# Patient Record
Sex: MALE | Race: WHITE | HISPANIC OR LATINO | Employment: FULL TIME | URBAN - METROPOLITAN AREA
[De-identification: names, ages, dates, MRNs, and addresses within clinical notes are randomized per-mention and may not be internally consistent; named-entity substitution may affect disease eponyms.]

---

## 2018-07-17 ENCOUNTER — HOSPITAL ENCOUNTER (OUTPATIENT)
Facility: HOSPITAL | Age: 38
Discharge: HOME | End: 2018-07-18
Attending: EMERGENCY MEDICINE | Admitting: INTERNAL MEDICINE
Payer: COMMERCIAL

## 2018-07-17 DIAGNOSIS — I48.91 ATRIAL FIBRILLATION, UNSPECIFIED TYPE: Primary | ICD-10-CM

## 2018-07-17 DIAGNOSIS — I48.91 A-FIB: ICD-10-CM

## 2018-07-17 DIAGNOSIS — I48.92 ATRIAL FLUTTER, UNSPECIFIED TYPE: ICD-10-CM

## 2018-07-17 DIAGNOSIS — I48.91 ATRIAL FIBRILLATION: ICD-10-CM

## 2018-07-17 DIAGNOSIS — I47.10 SVT (SUPRAVENTRICULAR TACHYCARDIA): ICD-10-CM

## 2018-07-17 DIAGNOSIS — R00.0 TACHYCARDIA: ICD-10-CM

## 2018-07-17 LAB
ALBUMIN SERPL BCP-MCNC: 4.4 G/DL
ALP SERPL-CCNC: 67 U/L
ALT SERPL W/O P-5'-P-CCNC: 30 U/L
AMPHET+METHAMPHET UR QL: NEGATIVE
ANION GAP SERPL CALC-SCNC: 10 MMOL/L
AST SERPL-CCNC: 23 U/L
BARBITURATES UR QL SCN>200 NG/ML: NEGATIVE
BASOPHILS # BLD AUTO: 0.02 K/UL
BASOPHILS NFR BLD: 0.2 %
BENZODIAZ UR QL SCN>200 NG/ML: NEGATIVE
BILIRUB SERPL-MCNC: 0.6 MG/DL
BUN SERPL-MCNC: 13 MG/DL
BZE UR QL SCN: NEGATIVE
CALCIUM SERPL-MCNC: 9.6 MG/DL
CANNABINOIDS UR QL SCN: NEGATIVE
CHLORIDE SERPL-SCNC: 103 MMOL/L
CO2 SERPL-SCNC: 27 MMOL/L
CREAT SERPL-MCNC: 1.2 MG/DL
CREAT UR-MCNC: 50.3 MG/DL
DIFFERENTIAL METHOD: ABNORMAL
EOSINOPHIL # BLD AUTO: 0.2 K/UL
EOSINOPHIL NFR BLD: 2.1 %
ERYTHROCYTE [DISTWIDTH] IN BLOOD BY AUTOMATED COUNT: 12.8 %
EST. GFR  (AFRICAN AMERICAN): >60 ML/MIN/1.73 M^2
EST. GFR  (NON AFRICAN AMERICAN): >60 ML/MIN/1.73 M^2
GLUCOSE SERPL-MCNC: 128 MG/DL
HCT VFR BLD AUTO: 47.9 %
HGB BLD-MCNC: 16.7 G/DL
LYMPHOCYTES # BLD AUTO: 5.4 K/UL
LYMPHOCYTES NFR BLD: 47.4 %
MCH RBC QN AUTO: 29.7 PG
MCHC RBC AUTO-ENTMCNC: 34.9 G/DL
MCV RBC AUTO: 85 FL
METHADONE UR QL SCN>300 NG/ML: NEGATIVE
MONOCYTES # BLD AUTO: 1 K/UL
MONOCYTES NFR BLD: 8.4 %
NEUTROPHILS # BLD AUTO: 4.8 K/UL
NEUTROPHILS NFR BLD: 41.6 %
OPIATES UR QL SCN: NEGATIVE
PCP UR QL SCN>25 NG/ML: NEGATIVE
PLATELET # BLD AUTO: 251 K/UL
PMV BLD AUTO: 9.8 FL
POTASSIUM SERPL-SCNC: 4 MMOL/L
PROT SERPL-MCNC: 7.7 G/DL
RBC # BLD AUTO: 5.62 M/UL
SODIUM SERPL-SCNC: 140 MMOL/L
TOXICOLOGY INFORMATION: NORMAL
TROPONIN I SERPL DL<=0.01 NG/ML-MCNC: <0.006 NG/ML
TSH SERPL DL<=0.005 MIU/L-ACNC: 2.9 UIU/ML
WBC # BLD AUTO: 11.44 K/UL

## 2018-07-17 PROCEDURE — 93005 ELECTROCARDIOGRAM TRACING: CPT

## 2018-07-17 PROCEDURE — 96375 TX/PRO/DX INJ NEW DRUG ADDON: CPT

## 2018-07-17 PROCEDURE — 96374 THER/PROPH/DIAG INJ IV PUSH: CPT

## 2018-07-17 PROCEDURE — 85025 COMPLETE CBC W/AUTO DIFF WBC: CPT

## 2018-07-17 PROCEDURE — G0378 HOSPITAL OBSERVATION PER HR: HCPCS

## 2018-07-17 PROCEDURE — 25000003 PHARM REV CODE 250: Performed by: EMERGENCY MEDICINE

## 2018-07-17 PROCEDURE — 96372 THER/PROPH/DIAG INJ SC/IM: CPT | Mod: 59

## 2018-07-17 PROCEDURE — 96361 HYDRATE IV INFUSION ADD-ON: CPT

## 2018-07-17 PROCEDURE — 93010 EKG 12-LEAD: ICD-10-PCS | Mod: 76,,, | Performed by: INTERNAL MEDICINE

## 2018-07-17 PROCEDURE — 93010 ELECTROCARDIOGRAM REPORT: CPT | Mod: 76,,, | Performed by: INTERNAL MEDICINE

## 2018-07-17 PROCEDURE — 99291 CRITICAL CARE FIRST HOUR: CPT | Mod: 25

## 2018-07-17 PROCEDURE — 84443 ASSAY THYROID STIM HORMONE: CPT

## 2018-07-17 PROCEDURE — 80307 DRUG TEST PRSMV CHEM ANLYZR: CPT

## 2018-07-17 PROCEDURE — 84484 ASSAY OF TROPONIN QUANT: CPT

## 2018-07-17 PROCEDURE — 63600175 PHARM REV CODE 636 W HCPCS: Performed by: EMERGENCY MEDICINE

## 2018-07-17 PROCEDURE — 80053 COMPREHEN METABOLIC PANEL: CPT

## 2018-07-17 RX ORDER — HYDROCODONE BITARTRATE AND ACETAMINOPHEN 10; 325 MG/1; MG/1
1 TABLET ORAL EVERY 4 HOURS PRN
Status: DISCONTINUED | OUTPATIENT
Start: 2018-07-17 | End: 2018-07-18 | Stop reason: HOSPADM

## 2018-07-17 RX ORDER — ENOXAPARIN SODIUM 100 MG/ML
1 INJECTION SUBCUTANEOUS
Status: DISCONTINUED | OUTPATIENT
Start: 2018-07-17 | End: 2018-07-18 | Stop reason: HOSPADM

## 2018-07-17 RX ORDER — SODIUM CHLORIDE 9 MG/ML
INJECTION, SOLUTION INTRAVENOUS CONTINUOUS
Status: DISCONTINUED | OUTPATIENT
Start: 2018-07-17 | End: 2018-07-18 | Stop reason: HOSPADM

## 2018-07-17 RX ORDER — HYDROCODONE BITARTRATE AND ACETAMINOPHEN 5; 325 MG/1; MG/1
1 TABLET ORAL EVERY 4 HOURS PRN
Status: DISCONTINUED | OUTPATIENT
Start: 2018-07-17 | End: 2018-07-18 | Stop reason: HOSPADM

## 2018-07-17 RX ORDER — ONDANSETRON 4 MG/1
4 TABLET, ORALLY DISINTEGRATING ORAL EVERY 8 HOURS PRN
Status: DISCONTINUED | OUTPATIENT
Start: 2018-07-17 | End: 2018-07-18 | Stop reason: HOSPADM

## 2018-07-17 RX ORDER — ACETAMINOPHEN 325 MG/1
650 TABLET ORAL EVERY 6 HOURS PRN
Status: DISCONTINUED | OUTPATIENT
Start: 2018-07-17 | End: 2018-07-18 | Stop reason: HOSPADM

## 2018-07-17 RX ORDER — AMOXICILLIN 250 MG
1 CAPSULE ORAL 2 TIMES DAILY
Status: DISCONTINUED | OUTPATIENT
Start: 2018-07-17 | End: 2018-07-18 | Stop reason: HOSPADM

## 2018-07-17 RX ORDER — PANTOPRAZOLE SODIUM 40 MG/1
40 TABLET, DELAYED RELEASE ORAL DAILY
Status: DISCONTINUED | OUTPATIENT
Start: 2018-07-18 | End: 2018-07-18 | Stop reason: HOSPADM

## 2018-07-17 RX ORDER — DILTIAZEM HYDROCHLORIDE 5 MG/ML
15 INJECTION INTRAVENOUS
Status: COMPLETED | OUTPATIENT
Start: 2018-07-17 | End: 2018-07-17

## 2018-07-17 RX ORDER — LORAZEPAM 2 MG/ML
INJECTION INTRAMUSCULAR
Status: DISPENSED
Start: 2018-07-17 | End: 2018-07-18

## 2018-07-17 RX ORDER — DILTIAZEM HYDROCHLORIDE 60 MG/1
120 TABLET, FILM COATED ORAL EVERY 12 HOURS
Status: DISCONTINUED | OUTPATIENT
Start: 2018-07-17 | End: 2018-07-18

## 2018-07-17 RX ADMIN — LORAZEPAM 0.5 MG: 2 INJECTION INTRAMUSCULAR; INTRAVENOUS at 08:07

## 2018-07-17 RX ADMIN — ENOXAPARIN SODIUM 100 MG: 100 INJECTION SUBCUTANEOUS at 09:07

## 2018-07-17 RX ADMIN — SODIUM CHLORIDE 1000 ML: 0.9 INJECTION, SOLUTION INTRAVENOUS at 07:07

## 2018-07-17 RX ADMIN — DILTIAZEM HYDROCHLORIDE 15 MG: 5 INJECTION INTRAVENOUS at 08:07

## 2018-07-17 RX ADMIN — SODIUM CHLORIDE: 0.9 INJECTION, SOLUTION INTRAVENOUS at 09:07

## 2018-07-17 RX ADMIN — DILTIAZEM HYDROCHLORIDE 120 MG: 60 TABLET, FILM COATED ORAL at 09:07

## 2018-07-18 ENCOUNTER — TELEPHONE (OUTPATIENT)
Dept: ELECTROPHYSIOLOGY | Facility: CLINIC | Age: 38
End: 2018-07-18

## 2018-07-18 VITALS
DIASTOLIC BLOOD PRESSURE: 59 MMHG | TEMPERATURE: 97 F | HEIGHT: 73 IN | RESPIRATION RATE: 79 BRPM | SYSTOLIC BLOOD PRESSURE: 129 MMHG | WEIGHT: 210.75 LBS | HEART RATE: 77 BPM | OXYGEN SATURATION: 99 % | BODY MASS INDEX: 27.93 KG/M2

## 2018-07-18 PROBLEM — I48.92 ATRIAL FLUTTER: Status: ACTIVE | Noted: 2018-07-17

## 2018-07-18 LAB
DIASTOLIC DYSFUNCTION: NO
ESTIMATED PA SYSTOLIC PRESSURE: 19.65
MITRAL VALVE MOBILITY: NORMAL
RETIRED EF AND QEF - SEE NOTES: 60 (ref 55–65)
TRICUSPID VALVE REGURGITATION: NORMAL
TROPONIN I SERPL DL<=0.01 NG/ML-MCNC: 0.13 NG/ML
TROPONIN I SERPL DL<=0.01 NG/ML-MCNC: 0.22 NG/ML

## 2018-07-18 PROCEDURE — 93005 ELECTROCARDIOGRAM TRACING: CPT

## 2018-07-18 PROCEDURE — 36415 COLL VENOUS BLD VENIPUNCTURE: CPT

## 2018-07-18 PROCEDURE — 25000003 PHARM REV CODE 250: Performed by: EMERGENCY MEDICINE

## 2018-07-18 PROCEDURE — 84484 ASSAY OF TROPONIN QUANT: CPT

## 2018-07-18 PROCEDURE — 94761 N-INVAS EAR/PLS OXIMETRY MLT: CPT

## 2018-07-18 PROCEDURE — 93306 TTE W/DOPPLER COMPLETE: CPT | Mod: 26,,, | Performed by: INTERNAL MEDICINE

## 2018-07-18 PROCEDURE — 99220 PR INITIAL OBSERVATION CARE,LEVL III: CPT | Mod: ,,, | Performed by: INTERNAL MEDICINE

## 2018-07-18 PROCEDURE — 93306 TTE W/DOPPLER COMPLETE: CPT

## 2018-07-18 PROCEDURE — 93010 ELECTROCARDIOGRAM REPORT: CPT | Mod: ,,, | Performed by: INTERNAL MEDICINE

## 2018-07-18 PROCEDURE — G0378 HOSPITAL OBSERVATION PER HR: HCPCS

## 2018-07-18 PROCEDURE — 63600175 PHARM REV CODE 636 W HCPCS: Performed by: EMERGENCY MEDICINE

## 2018-07-18 PROCEDURE — 25000003 PHARM REV CODE 250: Performed by: NURSE PRACTITIONER

## 2018-07-18 RX ORDER — ASPIRIN 81 MG/1
81 TABLET ORAL DAILY
Refills: 0 | COMMUNITY
Start: 2018-07-18 | End: 2023-04-04

## 2018-07-18 RX ORDER — DILTIAZEM HYDROCHLORIDE 180 MG/1
180 CAPSULE, COATED, EXTENDED RELEASE ORAL DAILY
Status: DISCONTINUED | OUTPATIENT
Start: 2018-07-18 | End: 2018-07-18 | Stop reason: HOSPADM

## 2018-07-18 RX ORDER — ASPIRIN 81 MG/1
81 TABLET ORAL DAILY
Status: DISCONTINUED | OUTPATIENT
Start: 2018-07-18 | End: 2018-07-18 | Stop reason: HOSPADM

## 2018-07-18 RX ORDER — DILTIAZEM HYDROCHLORIDE 180 MG/1
180 CAPSULE, COATED, EXTENDED RELEASE ORAL DAILY
Qty: 30 CAPSULE | Refills: 11 | Status: SHIPPED | OUTPATIENT
Start: 2018-07-18 | End: 2019-07-25 | Stop reason: SDUPTHER

## 2018-07-18 RX ADMIN — DILTIAZEM HYDROCHLORIDE 120 MG: 60 TABLET, FILM COATED ORAL at 09:07

## 2018-07-18 RX ADMIN — SODIUM CHLORIDE: 0.9 INJECTION, SOLUTION INTRAVENOUS at 05:07

## 2018-07-18 RX ADMIN — ENOXAPARIN SODIUM 100 MG: 100 INJECTION SUBCUTANEOUS at 09:07

## 2018-07-18 RX ADMIN — DILTIAZEM HYDROCHLORIDE 180 MG: 180 CAPSULE, COATED, EXTENDED RELEASE ORAL at 02:07

## 2018-07-18 RX ADMIN — ASPIRIN 81 MG: 81 TABLET, COATED ORAL at 02:07

## 2018-07-18 RX ADMIN — PANTOPRAZOLE SODIUM 40 MG: 40 TABLET, DELAYED RELEASE ORAL at 09:07

## 2018-07-18 RX ADMIN — SENNOSIDES AND DOCUSATE SODIUM 1 TABLET: 8.6; 5 TABLET ORAL at 09:07

## 2018-07-18 RX ADMIN — SENNOSIDES AND DOCUSATE SODIUM 1 TABLET: 8.6; 5 TABLET ORAL at 12:07

## 2018-07-18 NOTE — PLAN OF CARE
Problem: Patient Care Overview  Goal: Plan of Care Review  Outcome: Ongoing (interventions implemented as appropriate)  Admitted 38 year old Male patient from ER, came in to the unit around 2330H via stretcher.  MD informed regarding this admission, said that they corine see patient in the morning.  Received patient conscious, coherent GCS15. with IV line on the left antecubital area gauge 20 IVF infusing well with NSS 125cc/hr.  Case of SVT, went on controlled Atrial fibrillation  after receiving Diltizem IV  From ER. Patient on telemetry, Patient is asymptomatic, no subjective complaint of chest pain. Controlled atrial fibrillation reverted to NSR around 0510H in the morning today. Advised patient to call the nurse for any assistance, bed alarm on, side rails kept secure at all times. Call bell within reach. Bed brake on. Safety fall precaution measures noted. Intentional rounding rendered. Will continue to monitor patient. Stable VS, afebrile, not in pain

## 2018-07-18 NOTE — ASSESSMENT & PLAN NOTE
-presented with complaints of palpitations  -initial EKG with HR 260s; given IV Cardizem with slowed HR to 120s  -continued on oral Cardizem BID overnight with spontaneous conversion to NSR  -will do echocardiogram to evaluate LV function and valve structure  -plan for transition to Cardizem CD; continue ASA only; CHADSVAS score 0   -will refer to EP for discussion of RFA

## 2018-07-18 NOTE — PLAN OF CARE
[place] called Dr. Sage office, they were unable to give appoinemtns at this time and will contact the patient directly.  Patient has opted for pharmacy of choice for DC scripts and his wife here to bring him home at this time.  Follow-up With  Details  Why  Contact Info   Jorge Sage MD    This clinic will contact you with your follow up date and time with the Cardiology MD.  [address] [facility] [address]  [phone]             07/18/18 1345   Final Note   Assessment Type Final Discharge Note   Discharge Disposition Home   Hospital Follow Up  Appt(s) scheduled? Yes   Discharge plans and expectations educations in teach back method with documentation complete? Yes   Right Care Referral Info   Post Acute Recommendation No Care

## 2018-07-18 NOTE — HPI
37yo male with history of HLP who presented to the ER with complaints of palpitations. Mr. Krueger reports working his normal 12 hour shift yesterday and returning home. After dinner while sitting at rest, he complains of palpitations described as racing heart. The palpitations were associated with mild chest discomfort but was not associated with SOB, presyncope or syncope. He waited a few minutes expecting it to relieve however it was persisting therefore he presented to the ER for evaluation. He reports the palpitations were present upon arrival and eventually relieved after 45minutes. He denies any increase intake of ETOH or caffeine or recent OTC medication use. 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

## 2018-07-18 NOTE — PLAN OF CARE
Problem: Patient Care Overview  Goal: Plan of Care Review  Outcome: Ongoing (interventions implemented as appropriate)  Patient on RA with sats as documented.Will continue to monitor.

## 2018-07-18 NOTE — SUBJECTIVE & OBJECTIVE
Past Medical History:   Diagnosis Date    Elevated cholesterol     HLD (hyperlipidemia)     Hypertension     Tachycardia        History reviewed. No pertinent surgical history.    Review of patient's allergies indicates:  No Known Allergies    No current facility-administered medications on file prior to encounter.      No current outpatient prescriptions on file prior to encounter.     Family History     None        Social History Main Topics    Smoking status: Never Smoker    Smokeless tobacco: Never Used    Alcohol use Not on file    Drug use: No    Sexual activity: Not on file     Review of Systems   Constitution: Negative for chills, decreased appetite, diaphoresis, fever and weakness.   Cardiovascular: Negative for chest pain, claudication, cyanosis, dyspnea on exertion, irregular heartbeat, leg swelling, near-syncope, orthopnea, palpitations, paroxysmal nocturnal dyspnea and syncope.   Respiratory: Negative for cough, hemoptysis, shortness of breath and wheezing.    Gastrointestinal: Negative for bloating, abdominal pain, constipation, diarrhea, melena, nausea and vomiting.   Neurological: Negative for dizziness.     Objective:     Vital Signs (Most Recent):  Temp: 97.2 °F (36.2 °C) (07/18/18 0725)  Pulse: 84 (07/18/18 0725)  Resp: (!) 79 (07/18/18 0725)  BP: (!) 129/59 (07/18/18 0725)  SpO2: 99 % (07/18/18 0747) Vital Signs (24h Range):  Temp:  [97.2 °F (36.2 °C)-98.9 °F (37.2 °C)] 97.2 °F (36.2 °C)  Pulse:  [] 84  Resp:  [17-79] 79  SpO2:  [97 %-100 %] 99 %  BP: (119-146)/(59-91) 129/59     Weight: 95.6 kg (210 lb 12.2 oz)  Body mass index is 27.81 kg/m².    SpO2: 99 %  O2 Device (Oxygen Therapy): room air      Intake/Output Summary (Last 24 hours) at 07/18/18 1027  Last data filed at 07/18/18 0700   Gross per 24 hour   Intake             2325 ml   Output             2125 ml   Net              200 ml       Lines/Drains/Airways     Peripheral Intravenous Line                 Peripheral IV -  Single Lumen 07/17/18 1945 Left Antecubital less than 1 day                Physical Exam   Constitutional: He is oriented to person, place, and time. He appears well-developed and well-nourished. No distress.   Cardiovascular: Normal rate and regular rhythm.  Exam reveals no gallop.    No murmur heard.  Pulmonary/Chest: Effort normal and breath sounds normal. No respiratory distress. He has no wheezes.   Abdominal: Soft. Bowel sounds are normal. He exhibits no distension. There is no tenderness.   Neurological: He is alert and oriented to person, place, and time.   Skin: Skin is warm and dry.       Significant Labs:       Recent Labs  Lab 07/17/18 2000   CALCIUM 9.6   PROT 7.7      K 4.0   CO2 27      BUN 13   CREATININE 1.2   ALKPHOS 67   ALT 30   AST 23   BILITOT 0.6       Recent Labs  Lab 07/17/18 2000   WBC 11.44   RBC 5.62   HGB 16.7   HCT 47.9      MCV 85   MCH 29.7   MCHC 34.9         Significant Imaging: Echocardiogram: 2D echo with color flow doppler: ordered with pending results

## 2018-07-18 NOTE — TELEPHONE ENCOUNTER
----- Message from Dayana Ambrose sent at 7/18/2018 12:04 PM CDT -----  Contact: Pt called   Pt would like to schedule a hospital f/u visit with Dr. Sage. Please call pt @ [phone]. Thank you.

## 2018-07-18 NOTE — HOSPITAL COURSE
7/17/2018 Presented to the ER with complaints of palpitations. EKG upon presentation with HR 260s with wide complex tachycardia. Given IV Cardizem with HR slowed to 120s. CBC, BMP and TSH WNL. Admitted to [facility] for further management  7/18/2018 Placed on oral Cardizem BID with spontaneous conversion overnight to NSR. Will continue Cardizem along with ASA. Echocardiogram today. Anticipate discharge this afternoon

## 2018-07-18 NOTE — PLAN OF CARE
Patient accompanied by spouse, independent and drives, wife is of same capacity.  Expects DC today.       07/18/18 1341   Discharge Assessment   Assessment Type Discharge Planning Assessment   Confirmed/corrected address and phone number on facesheet? Yes   Assessment information obtained from? Patient   Prior to hospitilization cognitive status: Alert/Oriented   Prior to hospitalization functional status: Independent   Current cognitive status: Alert/Oriented   Current Functional Status: Independent   Lives With spouse   Able to Return to Prior Arrangements yes   Is patient able to care for self after discharge? Yes   Readmission Within The Last 30 Days no previous admission in last 30 days   Transportation Available none

## 2018-07-18 NOTE — ED NOTES
Pt here c/o while watching t.v. He started coughing a few times in a row and then felt palpitations--with this SOB and chest pressure.  First vagal attempt done by Maribel-no change in heart rate--tachycardic with wide QRS rate 250s-260. Pt is AAOx3,following commands,resp are regular.

## 2018-07-18 NOTE — TELEPHONE ENCOUNTER
I returned patient call, and spoke to his wife(Kelley).  We scheduled patient an appointment per Hospital D/C 7-18-18 [facility]  To see .  Wife stated she will  [facility] Records.  Stated patient had a few ekgs done there..

## 2018-07-18 NOTE — H&P
[facility]  Cardiology  History and Physical     Patient Name: Fermin Krueger  MRN: [id number]  Admission Date: 7/17/2018  Code Status: Full Code   Attending Provider: Stephani Steinberg MD   Primary Care Physician: Gerard Yang MD  Principal Problem:Atrial flutter    Patient information was obtained from patient and past medical records.     Subjective:     Chief Complaint:  palpitations     HPI:  39yo male with history of HLP who presented to the ER with complaints of palpitations. Mr. Krueger reports working his normal 12 hour shift yesterday and returning home. After dinner while sitting at rest, he complains of palpitations described as racing heart. The palpitations were associated with mild chest discomfort but was not associated with SOB, presyncope or syncope. He waited a few minutes expecting it to relieve however it was persisting therefore he presented to the ER for evaluation. He reports the palpitations were present upon arrival and eventually relieved after 45minutes. He denies any increase intake of ETOH or caffeine or recent OTC medication use. 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 EKG upon presentation with HR 260s with wide complex tachycardia. Given IV Cardizem with HR slowed to 120s. CBC, BMP and TSH WNL. Admitted to [facility] for further management  7/18/2018 Placed on oral Cardizem BID with spontaneous conversion overnight to NSR. Will continue Cardizem along with ASA. Echocardiogram today. Anticipate discharge this afternoon     Past Medical History:   Diagnosis Date    Elevated cholesterol     HLD (hyperlipidemia)     Hypertension     Tachycardia        History reviewed. No pertinent surgical history.    Review of patient's allergies indicates:  No Known Allergies    No current facility-administered medications on  file prior to encounter.      No current outpatient prescriptions on file prior to encounter.     Family History     None        Social History Main Topics    Smoking status: Never Smoker    Smokeless tobacco: Never Used    Alcohol use Not on file    Drug use: No    Sexual activity: Not on file     Review of Systems   Constitution: Negative for chills, decreased appetite, diaphoresis, fever and weakness.   Cardiovascular: Negative for chest pain, claudication, cyanosis, dyspnea on exertion, irregular heartbeat, leg swelling, near-syncope, orthopnea, palpitations, paroxysmal nocturnal dyspnea and syncope.   Respiratory: Negative for cough, hemoptysis, shortness of breath and wheezing.    Gastrointestinal: Negative for bloating, abdominal pain, constipation, diarrhea, melena, nausea and vomiting.   Neurological: Negative for dizziness.     Objective:     Vital Signs (Most Recent):  Temp: 97.2 °F (36.2 °C) (07/18/18 0725)  Pulse: 84 (07/18/18 0725)  Resp: (!) 79 (07/18/18 0725)  BP: (!) 129/59 (07/18/18 0725)  SpO2: 99 % (07/18/18 0747) Vital Signs (24h Range):  Temp:  [97.2 °F (36.2 °C)-98.9 °F (37.2 °C)] 97.2 °F (36.2 °C)  Pulse:  [] 84  Resp:  [17-79] 79  SpO2:  [97 %-100 %] 99 %  BP: (119-146)/(59-91) 129/59     Weight: 95.6 kg (210 lb 12.2 oz)  Body mass index is 27.81 kg/m².    SpO2: 99 %  O2 Device (Oxygen Therapy): room air      Intake/Output Summary (Last 24 hours) at 07/18/18 1027  Last data filed at 07/18/18 0700   Gross per 24 hour   Intake             2325 ml   Output             2125 ml   Net              200 ml       Lines/Drains/Airways     Peripheral Intravenous Line                 Peripheral IV - Single Lumen 07/17/18 1945 Left Antecubital less than 1 day                Physical Exam   Constitutional: He is oriented to person, place, and time. He appears well-developed and well-nourished. No distress.   Cardiovascular: Normal rate and regular rhythm.  Exam reveals no gallop.    No murmur  heard.  Pulmonary/Chest: Effort normal and breath sounds normal. No respiratory distress. He has no wheezes.   Abdominal: Soft. Bowel sounds are normal. He exhibits no distension. There is no tenderness.   Neurological: He is alert and oriented to person, place, and time.   Skin: Skin is warm and dry.       Significant Labs:       Recent Labs  Lab 07/17/18 2000   CALCIUM 9.6   PROT 7.7      K 4.0   CO2 27      BUN 13   CREATININE 1.2   ALKPHOS 67   ALT 30   AST 23   BILITOT 0.6       Recent Labs  Lab 07/17/18 2000   WBC 11.44   RBC 5.62   HGB 16.7   HCT 47.9      MCV 85   MCH 29.7   MCHC 34.9         Significant Imaging: Echocardiogram: 2D echo with color flow doppler: ordered with pending results     Assessment and Plan:     * Atrial flutter    -presented with complaints of palpitations  -initial EKG with HR 260s; given IV Cardizem with slowed HR to 120s  -continued on oral Cardizem BID overnight with spontaneous conversion to NSR  -will do echocardiogram to evaluate LV function and valve structure  -plan for transition to Cardizem CD; continue ASA only; CHADSVAS score 0   -will refer to EP for discussion of RFA             VTE Risk Mitigation         Ordered     enoxaparin injection 100 mg  Every 12 hours (non-standard times)      07/17/18 2121          LALY Barron, ANP  Cardiology   [facility]

## 2018-07-18 NOTE — NURSING
Pt is awake and alert. Pt continued to be NSR on telemetry with HR in the 80's.No ectopy noted.Denies any pain. No distress noted. IV removed from left AC,cath tip intact. Tele monitor removed. Discharge instructions given to patient and spouse.Discussed side effects of medications. All questions answered. Verbalized understanding.

## 2018-07-18 NOTE — ED NOTES
Pt states he had this 1 year ago and had a complete heart work up and was not placed on medications. Pt has history of high cholesterol and HTN--no medications Rx. He states he took 4 81 mg aspirin prior to arrival today. Was on baby aspirin daily until about 3 months ago. And reports history of valve problem--cardiologist did evaluate this.

## 2018-07-19 ENCOUNTER — OFFICE VISIT (OUTPATIENT)
Dept: ELECTROPHYSIOLOGY | Facility: CLINIC | Age: 38
End: 2018-07-19
Payer: COMMERCIAL

## 2018-07-19 ENCOUNTER — HOSPITAL ENCOUNTER (OUTPATIENT)
Dept: CARDIOLOGY | Facility: CLINIC | Age: 38
Discharge: HOME | End: 2018-07-19
Payer: COMMERCIAL

## 2018-07-19 VITALS
WEIGHT: 213.88 LBS | DIASTOLIC BLOOD PRESSURE: 80 MMHG | HEIGHT: 73 IN | SYSTOLIC BLOOD PRESSURE: 128 MMHG | BODY MASS INDEX: 28.34 KG/M2 | HEART RATE: 70 BPM

## 2018-07-19 DIAGNOSIS — I48.0 PAROXYSMAL ATRIAL FIBRILLATION: Primary | ICD-10-CM

## 2018-07-19 DIAGNOSIS — I48.3 TYPICAL ATRIAL FLUTTER: ICD-10-CM

## 2018-07-19 DIAGNOSIS — I48.92 ATRIAL FLUTTER, UNSPECIFIED TYPE: ICD-10-CM

## 2018-07-19 PROCEDURE — 3008F BODY MASS INDEX DOCD: CPT | Mod: CPTII,S$GLB,, | Performed by: INTERNAL MEDICINE

## 2018-07-19 PROCEDURE — 93000 ELECTROCARDIOGRAM COMPLETE: CPT | Mod: S$GLB,,, | Performed by: INTERNAL MEDICINE

## 2018-07-19 PROCEDURE — 99999 PR PBB SHADOW E&M-EST. PATIENT-LVL III: CPT | Mod: PBBFAC,,, | Performed by: INTERNAL MEDICINE

## 2018-07-19 PROCEDURE — 99215 OFFICE O/P EST HI 40 MIN: CPT | Mod: S$GLB,,, | Performed by: INTERNAL MEDICINE

## 2018-07-19 RX ORDER — PROPAFENONE HYDROCHLORIDE 225 MG/1
225 CAPSULE, EXTENDED RELEASE ORAL EVERY 12 HOURS
Qty: 60 CAPSULE | Refills: 11 | Status: SHIPPED | OUTPATIENT
Start: 2018-07-19 | End: 2019-07-19 | Stop reason: SDUPTHER

## 2018-07-19 NOTE — PROGRESS NOTES
Subjective:    Patient ID:  Fermin Krueger is a 38 y.o. male who presents for evaluation of Atrial Flutter    Referring Cardiac Practitioner/Cardiologist: Ilsa Foster NP and Stephani Steinberg MD    HPI  I had the pleasure of seeing Mr. Krueger today in our electrophysiology clinic in consultation for his atrial arrhythmia. As you are aware he is a pleasant 38 year-old man with     He presented to [facility] on 7/17/2018 with complaint of palpitations after completing a 12 hour work shift. His ECG was consistent with a right bundle/wide complex tachycardia at 260 bpm. He was given IV diltiazem and rate slowed revealing what is consistent with typical atrial flutter and variable AV block. The next ECG was consistent with atrial fibrillation and then another later with sinus rhythm. He felt better. ECHO noted preserved LVEF. TSH was normal.    He reports an episode 1 year ago that lasted 5 minutes. He went to [facility]. Stress test was normal. 14 day event monitor was unremarkable. He notes occasional short palpitations.    Both episodes occurred after eating a large meal.    ECHO 7/2018 CONCLUSIONS     1 - Normal left ventricular systolic function (EF 60-65%).     2 - Normal left ventricular diastolic function.     3 - Normal right ventricular systolic function .     4 - The estimated PA systolic pressure is 20 mmHg.      Review of Systems   Constitution: Negative for fever, weakness and malaise/fatigue.   HENT: Negative for congestion and sore throat.    Eyes: Negative for blurred vision and visual disturbance.   Cardiovascular: Positive for irregular heartbeat and palpitations. Negative for chest pain, dyspnea on exertion, leg swelling, near-syncope, orthopnea, paroxysmal nocturnal dyspnea and syncope.   Respiratory: Negative for cough and shortness of breath.    Hematologic/Lymphatic: Negative for bleeding problem. Does not bruise/bleed easily.   Skin: Negative.    Musculoskeletal: Negative.     Gastrointestinal: Negative for bloating and abdominal pain.   Neurological: Negative for dizziness and focal weakness.        Objective:    Physical Exam   Constitutional: He is oriented to person, place, and time. He appears well-developed and well-nourished. No distress.   HENT:   Head: Normocephalic and atraumatic.   Eyes: Conjunctivae are normal. Right eye exhibits no discharge. Left eye exhibits no discharge.   Neck: Neck supple. No JVD present.   Cardiovascular: Normal rate and regular rhythm.  Exam reveals no gallop and no friction rub.    No murmur heard.  Pulmonary/Chest: Effort normal and breath sounds normal. No respiratory distress. He has no wheezes. He has no rales.   Abdominal: Soft. Bowel sounds are normal. He exhibits no distension. There is no tenderness. There is no rebound.   Musculoskeletal: He exhibits no edema.   Neurological: He is alert and oriented to person, place, and time.   Skin: Skin is warm and dry. He is not diaphoretic.   Psychiatric: He has a normal mood and affect. Judgment and thought content normal.   Vitals reviewed.        Assessment:       1. Paroxysmal atrial fibrillation    2. Typical atrial flutter         Plan:       In summary, Mr. Krueger is a pleasant man with symptomatic paroxysmal atrial fibrillation and flutter. Recent episode of flutter was consistent with 1:1 AV conduction with aberrant conduction. I had a long discussion with the patient about the pathophysiology and risks of atrial fibrillation and flutter and their basic pathophysiology, including its health implications and treatment options with the patient. Specifically, I addressed the need for CVA (stroke) prophylaxis with aspirin versus oral anticoagulation (warfarin vs NOACs, discussed bleeding risks, irreversibility of NOACs vs Vitamin K/plasma reversal of warfarin, and need to come to the ER for any head trauma for CT scanning even if asymptomatic). His [id number] score is 0 and long-term  anti-coagulation is currently not indicated. I also discussed the goal to reduce symptomatic arrhythmic episodes by pharmacologic and/or procedural methods and utilizing a rhythm versus a rate control strategy.     I spent about a half hour discussing the nature of PVI including transseptal puncture. We discussed risks and benefits at length. Our discussion included, but was not limited to the risk of death, infection, bleeding, stroke, MI, cardiac perforation, embolism, cardiac tamponade, vascular injury, AE fistula, injury to phrenic nerve, pulmonary vein stenosis and other organic injury including the possibility for need for surgery or pacemaker implantation.      I offered AAD versus PVI and RFA of CTI for symptomatic pAF/AFL. After our discussion, the patient opted for drug therapy for the time being.    Continue diltiazem, start propafenone long acting 225mg q12h    RTC in 3 months, if he chooses ablation will arrange    Thank you for allowing me to participate in the care of this patient. Please do not hesitate to call me with any questions or concerns.    Jorge Sage MD, PhD  Cardiac Electrophysiology

## 2018-07-19 NOTE — DISCHARGE SUMMARY
DC Summary    Admission Date: 7/17/2018  Discharge Date: 7/18/2018  Attending Physician: Stephani Steinberg      Condition on Discharge: Stable    Final Diagnosis:   SVT, afib with RVR, PAF  Procedures:   none  History of Present Illness : Refer to H&P admission note  Laboratory/Data : Lab Results   Component Value Date     07/17/2018    K 4.0 07/17/2018     07/17/2018    CO2 27 07/17/2018    BUN 13 07/17/2018    CREATININE 1.2 07/17/2018     (H) 07/17/2018    AST 23 07/17/2018    ALT 30 07/17/2018    ALBUMIN 4.4 07/17/2018    PROT 7.7 07/17/2018    BILITOT 0.6 07/17/2018    WBC 11.44 07/17/2018    HGB 16.7 07/17/2018    HCT 47.9 07/17/2018    MCV 85 07/17/2018     07/17/2018    TSH 2.903 07/17/2018       No results for input(s): CPK, CPKMB, TROPONINI, MB in the last 24 hours.      Recent Labs  Lab 07/18/18  0745   TROPONINI 0.128*         Lab Results   Component Value Date    TSH 2.903 07/17/2018       No results found for: HGBA1C      Hospital Course:  Patient present with SVT and Afib with RVR. Placed on po cardizem with lovenox and cardioverted over night so discharge to f/u with Cardiology as out patient. Stable for d/c. ChadsVasc=0    Discharge Medications:    Fermin Krueger   Home Medication Instructions ANIRUDH:[id number]    Printed on:07/19/18 1340   Medication Information                      aspirin (ECOTRIN) 81 MG EC tablet  Take 1 tablet (81 mg total) by mouth once daily.             diltiaZEM (CARDIZEM CD) 180 MG 24 hr capsule  Take 1 capsule (180 mg total) by mouth once daily.               Discharge Instructions: Heart healthy diet, compliance with medications  Follow up Appointments: f/u with me in 2 weeks, f/u with Dr. Sage. today    Disposition: discharge HOME

## 2018-07-19 NOTE — PATIENT INSTRUCTIONS
Qué es el aleteo auricular y la fibrilación auricular?        El corazón tiene billings propio sistema eléctrico, que genera las señales para comenzar cada latido. El latido del corazón empieza en easton de gali dos cavidades superiores, llamadas aurículas. Ciertos trastornos pueden causar que el latido de las aurículas sea demasiado rápido. Cuando el latido es rápido elva sigue siendo regular, el trastorno se llama aleteo auricular. Cuando las aurículas laten muy rápidamente y de forma irregular, el trastorno se conoce anne fibrilación auricular.  Causas del aleteo y la fibrilación auricular  Algunas de las causas de estos problemas son:  · Un ataque al corazón anterior  · Hipertensión (theresa presión arterial)  · Problemas de la glándula tiroidea  En muchos casos, la causa es desconocida.  Cuando las aurículas laten demasiado rápido  Si las aurículas laten demasiado rápido solamente de vez en cuando, el problema se conoce anne easton alteración paroxística del ritmo cardíaco. Si laten siempre demasiado rápido, se trata de un problema crónico.  Aleteo auricular  En el aleteo auricular, las señales eléctricas se desplazan en un circuito cerrado dentro de las aurículas y las obligan a latir demasiado rápido.  · El aleteo auricular puede causar síntomas similares a los de la fibrilación auricular. Además, puede producir ritmos aun más rápidos e irregulares en la fibrilación auricular.  Fibrilación auricular  En la fibrilación auricular, las células de las aurículas emiten señales eléctricas adicionales que las obligan a latir muy rápido y de forma irregular.  · Las aurículas laten tan rápido y de forma tan irregular que pueden simplemente temblar sin llegar a contraerse. Si las aurículas no se contraen, no mueven suficiente lena hacia las cavidades inferiores (los ventrículos), lo cual puede causar mareo o debilidad.  · La lena que no se mueve puede estancarse y formar coágulos en las aurículas. Estos coágulos pueden  desplazarse a otras partes del cuerpo y causar problemas serios anne un ataque cerebral.  Algunos de los síntomas del aleteo y la fibrilación auricular son:  · Palpitaciones (latidos cardíacos rápidos y agitados)  · Debilidad o cansancio  · Falta de aire al respirar  · Dolor o sensación de opresión en el pecho  · Mareos o sensación de aturdimiento  · Desmayos   Date Last Reviewed: 2/26/2014  © 5301-9566 [employer]. [address] [place] [id number]. Todos los derechos reservados. Esta información no pretende sustituir la atención médica profesional. Sólo billings médico puede diagnosticar y tratar un problema de huma.        Ablación con catéter    Fabienne arritmia (trastorno del ritmo cardíaco) puede producir fabienne frecuencia cardíaca demasiado rápida. La causa del problema suele estar en ciertas células defectuosas en el tejido del corazón. Los síntomas pueden ser molestos y pueden consistir en un ritmo cardíaco irregular, mareos y falta de aliento. Billings médico le ha recomendado fabienne ablación con catéter para tratar billings arritmia. Nettie procedimiento elimina las células que están causando el problema.  Antes del procedimiento  Antes de la ablación con catéter, se reunirá con el electrofisiólogo (médico especializado en el corazón especialmente preparado) que realizará el procedimiento y le indicará cómo prepararse. Es probable que le pidan que deje de ketan los medicamentos para el ritmo cardíaco, o que los cambie, kaila un período de tiempo antes del procedimiento. Siga las instrucciones de billings médico. Siga también estos consejos:  · Dígale a billings médico todos los medicamentos que jose r, ya lashae recetados o de venta tawanna, incluidas las hierbas medicinales, los suplementos y las vitaminas. [place] incluye también los medicamentos de uso diario, anne la insulina y los anticoagulantes. Si es alérgico a algún tipo de medicamento, avise al médico.  · Hágase todos los análisis de lena y las demás pruebas rutinarias  de laboratorio que le recomienden.  · No coma ni isi nada 12 horas antes del procedimiento.  Cómo se realiza la ablación con catéter  Para la ablación con catéter, se usan alambres finos y flexibles llamados catéteres con electrodo para buscar y destruir (extirpar) las células defectuosas. El procedimiento se lleva a cabo de la siguiente manera:  · Se traza un mapa de las señales eléctricas del corazón. Para encontrar el problema, se realiza un estudio electrofisiológico (EPS, por gali siglas en inglés). Kaila lionel estudio, el médico intenta inducir (iniciar) billings arritmia. A continuación, se crea un mapa de los impulsos eléctricos del corazón en el que se muestra el tipo de arritmia que usted tiene y la ubicación del problema. Utilizando el mapa anne guía, el médico sabe dónde debe realizar la ablación.  · Se destruyen las células defectuosas. Easton vez que el estudio electrofisiológico muestre la ubicación del problema, se introduce un catéter-electrodo hasta la alize afectada y se envía energía a través del catéter para destruir las células defectuosas.  · Se vuelve a comprobar el ritmo cardíaco. East[place] vez realizada la ablación de las células defectuosas, el médico intenta inducir o provocar de nuevo la arritmia. Si no es posible inducir un ritmo rápido, la ablación produjo el efecto deseado. Becka si el ritmo rápido comienza de nuevo, es probable que sea necesaria easton ablación adicional.  Billings experiencia kaila la ablación con catéter  En la mayoría de los casos, la ablación con catéter se realiza en un laboratorio de electrofisiología. Suele ketan entre dos y cuatro horas y, en ocasiones, más tiempo. Le darán medicamentos para evitar el dolor. Los medicamentos también pueden ayudarle a relajarse o a dormir kaila el procedimiento. Informe al médico o a la enfermera si siente molestias kaila la operación.  · Principio. En primer lugar le lavarán la piel de la jenny o del simi y le depilarán todo el vello que  hubiera en kevin alize. Nettie es el lugar donde le introducirán los catéteres. Le conectarán fabienne línea intravenosa (IV) en el brazo para administrarle medicamentos y líquidos. Para ayudar a mantener estéril (sin gérmenes) el sitio de la inserción, le envolverán el cuerpo con sábanas. Solo quedará expuesta la alize donde se insertarán los catéteres.  · Introducción de los catéteres. Para evitar el dolor, le dormirán con anestesia local la piel de la alize donde se insertarán los catéteres. A continuación, se utiliza fabienne aguja para hacer punciones en la vena o en la arteria y se introducen los catéteres a través de dichas punciones, guiándolos hasta el corazón con ayuda de la imagen en un monitor de blanquita X.  · Finalización. Fabienne vez concluido el procedimiento, se quitan los catéteres y se aplica presión en las punciones para facilitar billings cierre. No se necesitan suturas. Por último, lo trasladarán a la yeny de recuperación para que descanse. Deberá permanecer acostado por algunas horas. Le pedirán que por unas horas no mueva la pierna donde le insertaron los catéteres.   Riesgos y complicaciones  Los riesgos de la ablación con catéter son bastante bajos en comparación con los beneficios que proporciona. Hable con billings médico antes del procedimiento. Entre los riesgos y las posibles complicaciones se encuentran:  · Moretones o sangrado  · Coágulos de lena  · Un ritmo cardíaco demasiado lento (que requiere un marcapasos permanente)  · Perforación del músculo cardíaco, vaso sanguíneo o pulmón (puede requerir fabienne operación quirúrgica de emergencia)  · Daños a fabienne válvula cardíaca (poco probable)  · Ataque cerebral o ataque al corazón (también llamado infarto irma de miocardio, o MADISON)  · Muerte (extremadamente raro)   Date Last Reviewed: 5/1/2016  © 0801-0759 [employer]. [address] [place] [address], [place] [address]. Todos los derechos reservados. Esta información no pretende sustituir la atención médica  profesional. Sólo billings médico puede diagnosticar y tratar un problema de huma.

## 2018-07-20 DIAGNOSIS — R07.9 CHEST PAIN, UNSPECIFIED: Primary | ICD-10-CM

## 2018-07-20 DIAGNOSIS — I48.92 ATRIAL FLUTTER, UNSPECIFIED TYPE: Primary | ICD-10-CM

## 2018-07-21 NOTE — ED PROVIDER NOTES
Encounter Date: 7/17/2018       History     Chief Complaint   Patient presents with    Tachycardia     pt to triage ambulatory and began with heart racing while sitting at rest approx 30 mins prior to arrival; pt reports occured about 1 year ago and has resolved     HPI   This is a 38 y.o. male who has a past medical history of Elevated cholesterol; HLD (hyperlipidemia); Hypertension; and Tachycardia.   The patient presents to the Emergency Department with palpitations.   Symptoms began 30 min prior to arrival. Symptoms are associated with CP and shortness of breath, dizziness.  Pt denies diaphoresis.   Symptoms are aggravated by nothing.  Symptoms are relieved by nothing..   Patient has prior history of similar symptoms and was seen at . Not sure of diagnosis.  Pt has no past surgical history on file.      Review of patient's allergies indicates:  No Known Allergies  Past Medical History:   Diagnosis Date    Elevated cholesterol     HLD (hyperlipidemia)     Hypertension     Tachycardia      History reviewed. No pertinent surgical history.  History reviewed. No pertinent family history.  Social History   Substance Use Topics    Smoking status: Never Smoker    Smokeless tobacco: Never Used    Alcohol use Not on file     Review of Systems   Unable to perform ROS: Acuity of condition   Constitutional: Positive for activity change.   Respiratory: Positive for shortness of breath.    Cardiovascular: Positive for chest pain and palpitations.   Psychiatric/Behavioral: The patient is nervous/anxious.        Physical Exam     Initial Vitals [07/17/18 1942]   BP Pulse Resp Temp SpO2   134/83 (!) 172 20 98.9 °F (37.2 °C) 98 %      MAP       --         Physical Exam    Nursing note and vitals reviewed.  Constitutional: He appears well-developed and well-nourished. He appears distressed.   HENT:   Head: Normocephalic and atraumatic.   Mouth/Throat: Oropharynx is clear and moist.   Eyes: Conjunctivae are normal. Pupils  are equal, round, and reactive to light.   Neck: Normal range of motion. Neck supple.   Cardiovascular: Regular rhythm, normal heart sounds and intact distal pulses.   Tachycardia   Pulmonary/Chest: Breath sounds normal. No respiratory distress.   Abdominal: Soft. Bowel sounds are normal. He exhibits no distension. There is no tenderness.   Musculoskeletal: Normal range of motion. He exhibits no edema or tenderness.   Lymphadenopathy:     He has no cervical adenopathy.   Neurological: He is alert and oriented to person, place, and time.   Skin: Skin is warm and dry. Capillary refill takes less than 2 seconds. No rash noted. No erythema.   Psychiatric: He has a normal mood and affect. Thought content normal.         ED Course   Critical Care  Date/Time: 7/21/2018 2:16 AM  Performed by: MAUREEN KNOX  Authorized by: MAUREEN KNOX   Total critical care time (exclusive of procedural time) : 40 minutes        Labs Reviewed   CBC W/ AUTO DIFFERENTIAL - Abnormal; Notable for the following:        Result Value    Lymph # 5.4 (*)     All other components within normal limits   COMPREHENSIVE METABOLIC PANEL - Abnormal; Notable for the following:     Glucose 128 (*)     All other components within normal limits   TROPONIN I   TSH   DRUG SCREEN PANEL, URINE EMERGENCY        ECG Results          Repeat EKG 12-lead (Final result)  Result time 07/18/18 11:04:42    Final result by Interface, Lab In [facility] (07/18/18 11:04:42)                 Narrative:    Test Reason : R00.0,  Blood Pressure : / mmHG  Vent. Rate : 120 BPM     Atrial Rate : 271 BPM     P-R Int : 000 ms          QRS Dur : 140 ms      QT Int : 330 ms       P-R-T Axes : 256 045 037 degrees     QTc Int : 466 ms    Atrial flutter with variable A-V block  Nonspecific intraventricular block  T wave abnormality, consider lateral ischemia  Abnormal ECG  When compared with ECG of 17-JUL-2018 19:51,  Atrial flutter has replaced Wide QRS tachycardia  Vent. rate has decreased   BPM  Confirmed by Stephani Steinberg MD ([id number]) on 7/18/2018 11:04:30 AM    Referred By: MAUREEN KNOX           Confirmed By:Stephani Steinberg MD                             EKG 12-lead (Final result)  Result time 07/18/18 11:04:31    Final result by Interface, Lab In [facility] (07/18/18 11:04:31)                 Narrative:    Test Reason : R00.0,  Blood Pressure : / mmHG  Vent. Rate : 260 BPM     Atrial Rate : 000 BPM     P-R Int : 000 ms          QRS Dur : 206 ms      QT Int : 202 ms       P-R-T Axes : 000 190 000 degrees     QTc Int : 420 ms      Wide QRS tachycardia , likely SVT  Right bundle branch block  Septal infarct ,age undetermined  Lateral infarct ,age undetermined  Abnormal ECG  No previous ECGs available  Confirmed by Stephani Steinberg MD ([id number]) on 7/18/2018 11:04:21 AM    Referred By: MAUREEN KNOX           Confirmed By:Stephani Steinberg MD                            Imaging Results          X-Ray Chest AP Portable (Final result)  Result time 07/17/18 20:28:29    Final result by Tio Munguia MD (07/17/18 20:28:29)                 Impression:      No radiographic acute intrathoracic process seen.      Electronically signed by: Tio Munguia MD  Date:    07/17/2018  Time:    20:28             Narrative:    EXAMINATION:  XR CHEST AP PORTABLE    CLINICAL HISTORY:  Supraventricular tachycardia    TECHNIQUE:  Single frontal view of the chest was performed.    COMPARISON:  None    FINDINGS:  Monitoring leads overlie the chest.  Cardiomediastinal silhouette is within normal limits.  The lungs are symmetrically well expanded and clear.  No pleural effusion or pneumothorax.  No acute osseous process seen.                                                   ED Course as of Jul 20 2033   Tue Jul 17, 2018   1945 I was called to the room by the nurse for patient with heart rate in the 230s.  On exam patient appears uncomfortable, heart rate to 60 in a wide complex rhythm.  Considering patient's age and prior history of similar  instance of fast heart rate and palpitations, patient might have SVT or AFib/flutter with aberrancy.  Will attempt vagal maneuvers to convert.  If unsuccessful will consider adenosine.  Patient is not hypotensive, not in significant pain at this time, not unstable.  If patient does have any of those, we will consider cardioversion  [NP]   1950 Patient converted with modified vagal maneuver with carotid massage.  Patient now in a a flutter with variable AV block, intraventricular block in the 120s.  [NP]   2101 Troponin I: <0.006 [NP]   2101 Sodium: 140 [NP]   2101 Potassium: 4.0 [NP]   2101 Chloride: 103 [NP]   2101 CO2: 27 [NP]   2101 Glucose: (!) 128 [NP]   2101 BUN, Bld: 13 [NP]   2101 Creatinine: 1.2 [NP]   2101 WBC: 11.44 [NP]   2101 RBC: 5.62 [NP]   2101 Hemoglobin: 16.7 [NP]   2101 Hematocrit: 47.9 [NP]   2101 Platelets: 251 [NP]   2101 Phencyclidine: Negative [NP]   2101 Amphetamine Screen, Ur: Negative [NP]   2101 Barbiturate Screen, Ur: Negative [NP]   2101 Cocaine (Metab.): Negative [NP]   2101 Methadone metabolites: Negative [NP]   2101 Benzodiazepines: Negative [NP]   2101 The patient received Cardizem and heart rate now 90 to low 100s.  Will repeat EKG  [NP]      ED Course User Index  [NP] Conner Moulton MD     Case discussed with Dr. Steinberg, cardiology, who states repeat EKG is not WPW.  Recommended Cardizem which was given and heart rate improved.  Patient will be admitted to his service for new onset AFib.    Clinical Impression:     1. Atrial fibrillation, unspecified type    2. Tachycardia    3. SVT (supraventricular tachycardia)    4. Atrial fibrillation    5. A-fib    6. Atrial flutter, unspecified type                                  Conner Moulton MD  07/21/18 7922

## 2018-10-22 DIAGNOSIS — I48.0 PAROXYSMAL ATRIAL FIBRILLATION: Primary | ICD-10-CM

## 2018-10-23 ENCOUNTER — OFFICE VISIT (OUTPATIENT)
Dept: ELECTROPHYSIOLOGY | Facility: CLINIC | Age: 38
End: 2018-10-23
Payer: COMMERCIAL

## 2018-10-23 ENCOUNTER — HOSPITAL ENCOUNTER (OUTPATIENT)
Dept: CARDIOLOGY | Facility: CLINIC | Age: 38
Discharge: HOME | End: 2018-10-23
Payer: COMMERCIAL

## 2018-10-23 VITALS
DIASTOLIC BLOOD PRESSURE: 64 MMHG | BODY MASS INDEX: 28.25 KG/M2 | HEART RATE: 59 BPM | SYSTOLIC BLOOD PRESSURE: 116 MMHG | HEIGHT: 73 IN | WEIGHT: 213.19 LBS

## 2018-10-23 DIAGNOSIS — I48.0 PAROXYSMAL ATRIAL FIBRILLATION: ICD-10-CM

## 2018-10-23 DIAGNOSIS — I48.0 PAROXYSMAL ATRIAL FIBRILLATION: Primary | ICD-10-CM

## 2018-10-23 DIAGNOSIS — I48.3 TYPICAL ATRIAL FLUTTER: ICD-10-CM

## 2018-10-23 PROCEDURE — 99999 PR PBB SHADOW E&M-EST. PATIENT-LVL III: CPT | Mod: PBBFAC,,, | Performed by: INTERNAL MEDICINE

## 2018-10-23 PROCEDURE — 3008F BODY MASS INDEX DOCD: CPT | Mod: CPTII,S$GLB,, | Performed by: INTERNAL MEDICINE

## 2018-10-23 PROCEDURE — 93000 ELECTROCARDIOGRAM COMPLETE: CPT | Mod: S$GLB,,, | Performed by: INTERNAL MEDICINE

## 2018-10-23 PROCEDURE — 99214 OFFICE O/P EST MOD 30 MIN: CPT | Mod: S$GLB,,, | Performed by: INTERNAL MEDICINE

## 2018-10-23 NOTE — PROGRESS NOTES
Subjective:    Patient ID:  Fermin Krueger is a 38 y.o. male who presents for evaluation of Paroxysmal atrial fibrillation    Referring Cardiac Practitioner/Cardiologist: Ilsa Foster NP and Stephani Steinberg MD    HPI   Prior Hx:  I had the pleasure of seeing Mr. Krueger today in our electrophysiology clinic in consultation for his atrial arrhythmia. He presented to [facility] on 7/17/2018 with complaint of palpitations after completing a 12 hour work shift. His ECG was consistent with a right bundle/wide complex tachycardia at 260 bpm. He was given IV diltiazem and rate slowed revealing what is consistent with typical atrial flutter and variable AV block. The next ECG was consistent with atrial fibrillation and then another later with sinus rhythm. He felt better. ECHO noted preserved LVEF. TSH was normal.    He reports an episode 1 year ago that lasted 5 minutes. He went to [facility]. Stress test was normal. 14 day event monitor was unremarkable. He notes occasional short palpitations.    Both episodes occurred after eating a large meal.    ECHO 7/2018 CONCLUSIONS     1 - Normal left ventricular systolic function (EF 60-65%).     2 - Normal left ventricular diastolic function.     3 - Normal right ventricular systolic function .     4 - The estimated PA systolic pressure is 20 mmHg.    Interim Hx:  After discussion options including offering PVI, Mr. Krueger elected for anti-arrhythmic therapy. He presents for 3 month follow-up and feels well. He has had no symptomatic AF. He saw Dr. Rosado at [facility] for a second opinion.    My interpretation of today's in clinic ECG is sinus rhythm at 59 bpm.       Review of Systems   Constitution: Negative for fever, weakness and malaise/fatigue.   HENT: Negative for congestion and sore throat.    Eyes: Negative for blurred vision and visual disturbance.   Cardiovascular: Negative for chest pain, dyspnea on exertion, irregular heartbeat, leg swelling, near-syncope,  orthopnea, palpitations, paroxysmal nocturnal dyspnea and syncope.   Respiratory: Negative for cough and shortness of breath.    Hematologic/Lymphatic: Negative for bleeding problem. Does not bruise/bleed easily.   Skin: Negative.    Musculoskeletal: Negative.    Gastrointestinal: Negative for bloating and abdominal pain.   Neurological: Negative for dizziness and focal weakness.        Objective:    Physical Exam   Constitutional: He is oriented to person, place, and time. He appears well-developed and well-nourished. No distress.   HENT:   Head: Normocephalic and atraumatic.   Eyes: Conjunctivae are normal. Right eye exhibits no discharge. Left eye exhibits no discharge.   Neck: Neck supple. No JVD present.   Cardiovascular: Normal rate and regular rhythm. Exam reveals no gallop and no friction rub.   No murmur heard.  Pulmonary/Chest: Effort normal and breath sounds normal. No respiratory distress. He has no wheezes. He has no rales.   Abdominal: Soft. Bowel sounds are normal. He exhibits no distension. There is no tenderness. There is no rebound.   Musculoskeletal: He exhibits no edema.   Neurological: He is alert and oriented to person, place, and time.   Skin: Skin is warm and dry. He is not diaphoretic.   Psychiatric: He has a normal mood and affect. Judgment and thought content normal.   Vitals reviewed.        Assessment:       1. Paroxysmal atrial fibrillation    2. Typical atrial flutter         Plan:       In summary, Mr. Krueger is a pleasant man with symptomatic paroxysmal atrial fibrillation and flutter. Recent episode of flutter was consistent with 1:1 AV conduction with aberrant conduction. At our last visit we discussed AF and treatment options. He opted for anti-arrhythmic therapy.  His [id number] score is 0 and long-term anti-coagulation is currently not indicated. I also discussed the goal to reduce symptomatic arrhythmic episodes by pharmacologic and/or procedural methods and utilizing a rhythm  versus a rate control strategy. He is taking propafenone/diltiazem for rhythm control.     I offered AAD versus PVI and RFA of CTI for symptomatic pAF/AFL. We discussed at his stage, ablation or anti-arrhythmic drug therapy offer similar efficacy for freedom from AF. The only reason to perform an ablation would be due to his young age and desire to not stay on a drug. He currently desires to continue drug therapy. Should he fail anti-arrhythmic therapy then ablation strategy offers a better efficacy for freedom from AF than alternate drug therapy. After our discussion, the patient opted for drug therapy for the time being    RTC in 4 months    Thank you for allowing me to participate in the care of this patient. Please do not hesitate to call me with any questions or concerns.    Jorge Sage MD, PhD  Cardiac Electrophysiology

## 2019-07-19 DIAGNOSIS — I48.0 PAROXYSMAL ATRIAL FIBRILLATION: ICD-10-CM

## 2019-07-19 RX ORDER — PROPAFENONE HYDROCHLORIDE 225 MG/1
225 CAPSULE, EXTENDED RELEASE ORAL EVERY 12 HOURS
Qty: 60 CAPSULE | Refills: 11 | Status: SHIPPED | OUTPATIENT
Start: 2019-07-19 | End: 2019-10-04 | Stop reason: ALTCHOICE

## 2019-07-25 DIAGNOSIS — I48.0 PAROXYSMAL ATRIAL FIBRILLATION: Primary | ICD-10-CM

## 2019-07-25 RX ORDER — DILTIAZEM HYDROCHLORIDE 180 MG/1
180 CAPSULE, COATED, EXTENDED RELEASE ORAL DAILY
Qty: 30 CAPSULE | Refills: 3 | Status: SHIPPED | OUTPATIENT
Start: 2019-07-25 | End: 2019-10-04 | Stop reason: ALTCHOICE

## 2019-07-25 NOTE — TELEPHONE ENCOUNTER
----- Message from Yuni Ferris RN sent at 7/25/2019  2:00 PM CDT -----  Contact: Self  Alejandro Nicholas,  I would say yes since Dr. Sage agrees with using it for rhythm control method.  I would only put in for a 2-3 month refill and let pt know he needs to follow up with Dr. Sage in that time frame.  He was supposed to have a follow up in Feb 2019 according to Dr. Sage last clinic note.  Thank you,  Yuni  ----- Message -----  From: Heidy Templeton MA  Sent: 7/25/2019  12:24 PM  To: SIHELA Browne,   Can I refill?   Med was given by  cely Foster N.P   at Telemetry unit   [place]..    Carlee Nicholas      ----- Message -----  From: Lakeshia Darden  Sent: 7/25/2019   9:44 AM  To: Chu ZAMORA Staff    .Rx Refill/Request     Is this a Refill or New Rx:  Refill  Rx Name and Strength:  diltiaZEM (CARDIZEM CD) 180 MG 24 hr capsule  Preferred Pharmacy with phone number:  [facility]/pharmacy, [phone] Fax: [phone]  Communication Preference: [phone]  Additional Information: Thanks

## 2019-10-04 ENCOUNTER — OFFICE VISIT (OUTPATIENT)
Dept: CARDIOLOGY | Facility: CLINIC | Age: 39
End: 2019-10-04
Payer: COMMERCIAL

## 2019-10-04 ENCOUNTER — TELEPHONE (OUTPATIENT)
Dept: ELECTROPHYSIOLOGY | Facility: CLINIC | Age: 39
End: 2019-10-04

## 2019-10-04 ENCOUNTER — LAB VISIT (OUTPATIENT)
Dept: LAB | Facility: HOSPITAL | Age: 39
End: 2019-10-04
Attending: INTERNAL MEDICINE
Payer: COMMERCIAL

## 2019-10-04 VITALS
BODY MASS INDEX: 30.34 KG/M2 | HEART RATE: 72 BPM | WEIGHT: 224 LBS | SYSTOLIC BLOOD PRESSURE: 140 MMHG | HEIGHT: 72 IN | DIASTOLIC BLOOD PRESSURE: 82 MMHG

## 2019-10-04 DIAGNOSIS — I48.0 PAROXYSMAL ATRIAL FIBRILLATION: Primary | ICD-10-CM

## 2019-10-04 DIAGNOSIS — I48.0 PAROXYSMAL ATRIAL FIBRILLATION: ICD-10-CM

## 2019-10-04 DIAGNOSIS — I48.3 TYPICAL ATRIAL FLUTTER: ICD-10-CM

## 2019-10-04 LAB
ANION GAP SERPL CALC-SCNC: 8 MMOL/L (ref 8–16)
BUN SERPL-MCNC: 14 MG/DL (ref 6–20)
CALCIUM SERPL-MCNC: 9.8 MG/DL (ref 8.7–10.5)
CHLORIDE SERPL-SCNC: 105 MMOL/L (ref 95–110)
CO2 SERPL-SCNC: 28 MMOL/L (ref 23–29)
CREAT SERPL-MCNC: 1.1 MG/DL (ref 0.5–1.4)
EST. GFR  (AFRICAN AMERICAN): >60 ML/MIN/1.73 M^2
EST. GFR  (NON AFRICAN AMERICAN): >60 ML/MIN/1.73 M^2
GLUCOSE SERPL-MCNC: 99 MG/DL (ref 70–110)
POTASSIUM SERPL-SCNC: 5.3 MMOL/L (ref 3.5–5.1)
SODIUM SERPL-SCNC: 141 MMOL/L (ref 136–145)

## 2019-10-04 PROCEDURE — 3008F BODY MASS INDEX DOCD: CPT | Mod: CPTII,S$GLB,, | Performed by: INTERNAL MEDICINE

## 2019-10-04 PROCEDURE — 36415 COLL VENOUS BLD VENIPUNCTURE: CPT

## 2019-10-04 PROCEDURE — 99999 PR PBB SHADOW E&M-EST. PATIENT-LVL III: ICD-10-PCS | Mod: PBBFAC,,, | Performed by: INTERNAL MEDICINE

## 2019-10-04 PROCEDURE — 93010 RHYTHM STRIP: ICD-10-PCS | Mod: S$GLB,,, | Performed by: INTERNAL MEDICINE

## 2019-10-04 PROCEDURE — 93005 RHYTHM STRIP: ICD-10-PCS | Mod: S$GLB,,, | Performed by: INTERNAL MEDICINE

## 2019-10-04 PROCEDURE — 93010 ELECTROCARDIOGRAM REPORT: CPT | Mod: S$GLB,,, | Performed by: INTERNAL MEDICINE

## 2019-10-04 PROCEDURE — 99999 PR PBB SHADOW E&M-EST. PATIENT-LVL III: CPT | Mod: PBBFAC,,, | Performed by: INTERNAL MEDICINE

## 2019-10-04 PROCEDURE — 99214 PR OFFICE/OUTPT VISIT, EST, LEVL IV, 30-39 MIN: ICD-10-PCS | Mod: S$GLB,,, | Performed by: INTERNAL MEDICINE

## 2019-10-04 PROCEDURE — 93005 ELECTROCARDIOGRAM TRACING: CPT | Mod: S$GLB,,, | Performed by: INTERNAL MEDICINE

## 2019-10-04 PROCEDURE — 80048 BASIC METABOLIC PNL TOTAL CA: CPT

## 2019-10-04 PROCEDURE — 99214 OFFICE O/P EST MOD 30 MIN: CPT | Mod: S$GLB,,, | Performed by: INTERNAL MEDICINE

## 2019-10-04 PROCEDURE — 3008F PR BODY MASS INDEX (BMI) DOCUMENTED: ICD-10-PCS | Mod: CPTII,S$GLB,, | Performed by: INTERNAL MEDICINE

## 2019-10-04 RX ORDER — SOTALOL HYDROCHLORIDE 80 MG/1
80 TABLET ORAL 2 TIMES DAILY
Qty: 60 TABLET | Refills: 11 | Status: SHIPPED | OUTPATIENT
Start: 2019-10-04 | End: 2020-07-30

## 2019-10-04 NOTE — PATIENT INSTRUCTIONS
Stop propafenone/diltiazem  Start sotalol 80mg every 12 hours starting Tuesday 10/8/2019  Get an ECG at [facility] on the 3rd floor Wednesday 10/9/2019 at 11AM or 12PM and again on Friday 10/11/2019 at 11AM or 12PM.

## 2019-10-04 NOTE — PROGRESS NOTES
Subjective:    Patient ID:  Fermin Krueger is a 39 y.o. male who presents for evaluation of Atrial Fibrillation    Referring Cardiac Practitioner/Cardiologist: Ilsa Summers NP and Stephani Steinberg MD    HPI     Prior Hx:  I had the pleasure of seeing Mr. Krueger today in our electrophysiology clinic in consultation for his atrial arrhythmia. He presented to [facility] on 7/17/2018 with complaint of palpitations after completing a 12 hour work shift. His ECG was consistent with a right bundle/wide complex tachycardia at 260 bpm. He was given IV diltiazem and rate slowed revealing what is consistent with typical atrial flutter and variable AV block. The next ECG was consistent with atrial fibrillation and then another later with sinus rhythm. He felt better. ECHO noted preserved LVEF. TSH was normal.    He reports an episode 1 year ago that lasted 5 minutes. He went to [facility]. Stress test was normal. 14 day event monitor was unremarkable. He notes occasional short palpitations.    Both episodes occurred after eating a large meal.    ECHO 7/2018 CONCLUSIONS     1 - Normal left ventricular systolic function (EF 60-65%).     2 - Normal left ventricular diastolic function.     3 - Normal right ventricular systolic function .     4 - The estimated PA systolic pressure is 20 mmHg.    After discussion options including offering PVI, Mr. Krueger elected for anti-arrhythmic therapy (propafenone/diltiazem). He presented for 3 month follow-up 10/2018 and felt well. He has had no symptomatic AF. He saw Dr. Rosado at [facility] for a second opinion.    Interim Hx:  Mr. Krueger returns for follow-up visit. He reports he has been doing well without any symptomatic arrhythmia recurrences. He states he is still not ready for the ablation.  He is inquiring about cheaper anti-arrhythmic drugs.    My interpretation of today's in clinic ECG is sinus rhythm at 68 bpm with normal intervals.      Review of Systems   Constitution:  Negative for fever and malaise/fatigue.   HENT: Negative for congestion and sore throat.    Eyes: Negative for blurred vision and visual disturbance.   Cardiovascular: Negative for chest pain, dyspnea on exertion, irregular heartbeat, leg swelling, near-syncope, orthopnea, palpitations, paroxysmal nocturnal dyspnea and syncope.   Respiratory: Negative for cough and shortness of breath.    Hematologic/Lymphatic: Negative for bleeding problem. Does not bruise/bleed easily.   Skin: Negative.    Musculoskeletal: Negative.    Gastrointestinal: Negative for bloating and abdominal pain.   Neurological: Negative for dizziness, focal weakness and weakness.        Objective:    Physical Exam   Constitutional: He is oriented to person, place, and time. He appears well-developed and well-nourished. No distress.   HENT:   Head: Normocephalic and atraumatic.   Eyes: Conjunctivae are normal. Right eye exhibits no discharge. Left eye exhibits no discharge.   Neck: Neck supple. No JVD present.   Cardiovascular: Normal rate and regular rhythm. Exam reveals no gallop and no friction rub.   No murmur heard.  Pulmonary/Chest: Effort normal and breath sounds normal. No respiratory distress. He has no wheezes. He has no rales.   Abdominal: Soft. Bowel sounds are normal. He exhibits no distension. There is no tenderness. There is no rebound.   Musculoskeletal: He exhibits no edema.   Neurological: He is alert and oriented to person, place, and time.   Skin: Skin is warm and dry. He is not diaphoretic.   Psychiatric: He has a normal mood and affect. Judgment and thought content normal.   Vitals reviewed.        Assessment:       1. Paroxysmal atrial fibrillation    2. Typical atrial flutter         Plan:       In summary, Mr. Krueger is a pleasant man with symptomatic paroxysmal atrial fibrillation and flutter. Recent episode of flutter was consistent with 1:1 AV conduction with aberrant conduction. At our last visit we discussed AF and  treatment options. He opted for anti-arrhythmic therapy.  His [id number] score is 0 and long-term anti-coagulation is currently not indicated. I also discussed the goal to reduce symptomatic arrhythmic episodes by pharmacologic and/or procedural methods and utilizing a rhythm versus a rate control strategy. He is taking propafenone/diltiazem for rhythm control. He wants to take a cheaper anti-arrhythmic drug. Discussed sotalol. He is agreeable.    Plan  BMP today  Stop propafenone/diltiazem  Start sotalol 80mg every 12 hours starting Tuesday 10/8/2019  Get an ECG at [facility] on the 3rd floor Wednesday 10/9/2019 at 11AM or 12PM and again on Friday 10/11/2019 at 11AM or 12PM.    RTC in 4 months    Thank you for allowing me to participate in the care of this patient. Please do not hesitate to call me with any questions or concerns.    Jorge Sage MD, PhD  Cardiac Electrophysiology

## 2019-10-07 ENCOUNTER — TELEPHONE (OUTPATIENT)
Dept: ELECTROPHYSIOLOGY | Facility: CLINIC | Age: 39
End: 2019-10-07

## 2019-10-07 NOTE — TELEPHONE ENCOUNTER
----- Message from Jorge Sage MD sent at 10/4/2019 11:56 AM CDT -----  Please notify the patient that his kidney function is normal.   Viagra 100mg      Last Written Prescription Date: 08/01/16  Last Fill Quantity: 6,  # refills: 0   Last Office Visit with [facility], [facility] or [facility] prescribing provider: 11/01/16                                             Thank you -  Julio Ramey, Pharmacy Technician  [facility]  On Behalf Of [facility]

## 2019-10-09 ENCOUNTER — HOSPITAL ENCOUNTER (OUTPATIENT)
Dept: CARDIOLOGY | Facility: CLINIC | Age: 39
Discharge: HOME | End: 2019-10-09
Payer: COMMERCIAL

## 2019-10-09 DIAGNOSIS — I48.0 PAROXYSMAL ATRIAL FIBRILLATION: ICD-10-CM

## 2019-10-09 PROCEDURE — 93010 ELECTROCARDIOGRAM REPORT: CPT | Mod: S$GLB,,, | Performed by: INTERNAL MEDICINE

## 2019-10-09 PROCEDURE — 93005 ELECTROCARDIOGRAM TRACING: CPT | Mod: S$GLB,,, | Performed by: INTERNAL MEDICINE

## 2019-10-09 PROCEDURE — 93005 RHYTHM STRIP: ICD-10-PCS | Mod: S$GLB,,, | Performed by: INTERNAL MEDICINE

## 2019-10-09 PROCEDURE — 93010 RHYTHM STRIP: ICD-10-PCS | Mod: S$GLB,,, | Performed by: INTERNAL MEDICINE

## 2019-10-11 ENCOUNTER — HOSPITAL ENCOUNTER (OUTPATIENT)
Dept: CARDIOLOGY | Facility: CLINIC | Age: 39
Discharge: HOME | End: 2019-10-11
Payer: COMMERCIAL

## 2019-10-11 ENCOUNTER — TELEPHONE (OUTPATIENT)
Dept: ELECTROPHYSIOLOGY | Facility: CLINIC | Age: 39
End: 2019-10-11

## 2019-10-11 DIAGNOSIS — I48.0 PAROXYSMAL ATRIAL FIBRILLATION: ICD-10-CM

## 2019-10-11 PROCEDURE — 93010 RHYTHM STRIP: ICD-10-PCS | Mod: S$GLB,,, | Performed by: INTERNAL MEDICINE

## 2019-10-11 PROCEDURE — 93005 ELECTROCARDIOGRAM TRACING: CPT | Mod: S$GLB,,, | Performed by: INTERNAL MEDICINE

## 2019-10-11 PROCEDURE — 93010 ELECTROCARDIOGRAM REPORT: CPT | Mod: S$GLB,,, | Performed by: INTERNAL MEDICINE

## 2019-10-11 PROCEDURE — 93005 RHYTHM STRIP: ICD-10-PCS | Mod: S$GLB,,, | Performed by: INTERNAL MEDICINE

## 2020-08-03 ENCOUNTER — TELEPHONE (OUTPATIENT)
Dept: ELECTROPHYSIOLOGY | Facility: CLINIC | Age: 40
End: 2020-08-03

## 2020-08-03 DIAGNOSIS — I49.8 OTHER SPECIFIED CARDIAC ARRHYTHMIAS: Primary | ICD-10-CM

## 2020-08-03 NOTE — TELEPHONE ENCOUNTER
Jovannym for  to call the office to schedule a 3 month follow-up with  office .        ----- Message from Jorge Sage MD sent at 7/30/2020  3:00 PM CDT -----  Alejandro Reeves    Mr. Krueger needs follow-up with me in the next 3 months.  Thanks    Jorge

## 2020-09-15 ENCOUNTER — OFFICE VISIT (OUTPATIENT)
Dept: ELECTROPHYSIOLOGY | Facility: CLINIC | Age: 40
End: 2020-09-15
Payer: COMMERCIAL

## 2020-09-15 ENCOUNTER — LAB VISIT (OUTPATIENT)
Dept: LAB | Facility: HOSPITAL | Age: 40
End: 2020-09-15
Attending: INTERNAL MEDICINE
Payer: COMMERCIAL

## 2020-09-15 ENCOUNTER — HOSPITAL ENCOUNTER (OUTPATIENT)
Dept: CARDIOLOGY | Facility: CLINIC | Age: 40
Discharge: HOME | End: 2020-09-15
Payer: COMMERCIAL

## 2020-09-15 VITALS
HEIGHT: 73 IN | BODY MASS INDEX: 30.59 KG/M2 | HEART RATE: 54 BPM | DIASTOLIC BLOOD PRESSURE: 71 MMHG | WEIGHT: 230.81 LBS | SYSTOLIC BLOOD PRESSURE: 143 MMHG

## 2020-09-15 DIAGNOSIS — I49.8 OTHER SPECIFIED CARDIAC ARRHYTHMIAS: ICD-10-CM

## 2020-09-15 DIAGNOSIS — I48.0 PAROXYSMAL ATRIAL FIBRILLATION: ICD-10-CM

## 2020-09-15 DIAGNOSIS — I48.3 TYPICAL ATRIAL FLUTTER: ICD-10-CM

## 2020-09-15 DIAGNOSIS — I48.0 PAROXYSMAL ATRIAL FIBRILLATION: Primary | ICD-10-CM

## 2020-09-15 LAB
ANION GAP SERPL CALC-SCNC: 8 MMOL/L (ref 8–16)
BUN SERPL-MCNC: 12 MG/DL (ref 6–20)
CALCIUM SERPL-MCNC: 9.3 MG/DL (ref 8.7–10.5)
CHLORIDE SERPL-SCNC: 103 MMOL/L (ref 95–110)
CO2 SERPL-SCNC: 30 MMOL/L (ref 23–29)
CREAT SERPL-MCNC: 1 MG/DL (ref 0.5–1.4)
EST. GFR  (AFRICAN AMERICAN): >60 ML/MIN/1.73 M^2
EST. GFR  (NON AFRICAN AMERICAN): >60 ML/MIN/1.73 M^2
GLUCOSE SERPL-MCNC: 90 MG/DL (ref 70–110)
POTASSIUM SERPL-SCNC: 4.8 MMOL/L (ref 3.5–5.1)
SODIUM SERPL-SCNC: 141 MMOL/L (ref 136–145)

## 2020-09-15 PROCEDURE — 3008F PR BODY MASS INDEX (BMI) DOCUMENTED: ICD-10-PCS | Mod: CPTII,S$GLB,, | Performed by: INTERNAL MEDICINE

## 2020-09-15 PROCEDURE — 99999 PR PBB SHADOW E&M-EST. PATIENT-LVL III: CPT | Mod: PBBFAC,,, | Performed by: INTERNAL MEDICINE

## 2020-09-15 PROCEDURE — 99999 PR PBB SHADOW E&M-EST. PATIENT-LVL III: ICD-10-PCS | Mod: PBBFAC,,, | Performed by: INTERNAL MEDICINE

## 2020-09-15 PROCEDURE — 93005 RHYTHM STRIP: ICD-10-PCS | Mod: S$GLB,,, | Performed by: INTERNAL MEDICINE

## 2020-09-15 PROCEDURE — 36415 COLL VENOUS BLD VENIPUNCTURE: CPT

## 2020-09-15 PROCEDURE — 93010 ELECTROCARDIOGRAM REPORT: CPT | Mod: S$GLB,,, | Performed by: INTERNAL MEDICINE

## 2020-09-15 PROCEDURE — 99214 OFFICE O/P EST MOD 30 MIN: CPT | Mod: S$GLB,,, | Performed by: INTERNAL MEDICINE

## 2020-09-15 PROCEDURE — 99214 PR OFFICE/OUTPT VISIT, EST, LEVL IV, 30-39 MIN: ICD-10-PCS | Mod: S$GLB,,, | Performed by: INTERNAL MEDICINE

## 2020-09-15 PROCEDURE — 3008F BODY MASS INDEX DOCD: CPT | Mod: CPTII,S$GLB,, | Performed by: INTERNAL MEDICINE

## 2020-09-15 PROCEDURE — 93010 RHYTHM STRIP: ICD-10-PCS | Mod: S$GLB,,, | Performed by: INTERNAL MEDICINE

## 2020-09-15 PROCEDURE — 80048 BASIC METABOLIC PNL TOTAL CA: CPT

## 2020-09-15 PROCEDURE — 93005 ELECTROCARDIOGRAM TRACING: CPT | Mod: S$GLB,,, | Performed by: INTERNAL MEDICINE

## 2020-09-15 NOTE — PROGRESS NOTES
Subjective:    Patient ID:  Fermin Krueger is a 40 y.o. male who presents for evaluation of Atrial Fibrillation    Referring Cardiac Practitioner: Ilsa Summers NP    HPI     Prior Hx:  I had the pleasure of seeing Mr. Krueger today in our electrophysiology clinic in consultation for his atrial arrhythmia. He presented to [facility] on 7/17/2018 with complaint of palpitations after completing a 12 hour work shift. His ECG was consistent with a right bundle/wide complex tachycardia at 260 bpm. He was given IV diltiazem and rate slowed revealing what is consistent with typical atrial flutter and variable AV block. The next ECG was consistent with atrial fibrillation and then another later with sinus rhythm. He felt better. ECHO noted preserved LVEF. TSH was normal.    He reports an episode 1 year ago that lasted 5 minutes. He went to [facility]. Stress test was normal. 14 day event monitor was unremarkable. He notes occasional short palpitations.    Both episodes occurred after eating a large meal.    ECHO 7/2018 CONCLUSIONS     1 - Normal left ventricular systolic function (EF 60-65%).     2 - Normal left ventricular diastolic function.     3 - Normal right ventricular systolic function .     4 - The estimated PA systolic pressure is 20 mmHg.    After discussion options including offering PVI, Mr. Krueger elected for anti-arrhythmic therapy (propafenone/diltiazem). He presented for 3 month follow-up 10/2018 and felt well. He has had no symptomatic AF. He saw Dr. Rosado at [facility] for a second opinion.    Mr. Krueger returned for follow-up visit 10/2019. He reported he had been doing well without any symptomatic arrhythmia recurrences. He stated he was still not ready for the ablation.  He desired a cheaper anti-arrhythmic drug. We changed to sotalol.    Interim Hx:  Mr. Krueger returns for follow-up. He denies any symptomatic AF recurrences on sotalol.    My interpretation of today's in clinic ECG is sinus  bradycardia at 51 bpm with a normal QT interval (452ms)      Review of Systems   Constitution: Negative for fever and malaise/fatigue.   HENT: Negative for congestion and sore throat.    Eyes: Negative for blurred vision and visual disturbance.   Cardiovascular: Negative for chest pain, dyspnea on exertion, irregular heartbeat, leg swelling, near-syncope, orthopnea, palpitations, paroxysmal nocturnal dyspnea and syncope.   Respiratory: Negative for cough and shortness of breath.    Hematologic/Lymphatic: Negative for bleeding problem. Does not bruise/bleed easily.   Skin: Negative.    Musculoskeletal: Negative.    Gastrointestinal: Negative for bloating and abdominal pain.   Neurological: Negative for dizziness, focal weakness and weakness.        Objective:    Physical Exam   Constitutional: He is oriented to person, place, and time. He appears well-developed and well-nourished. No distress.   HENT:   Head: Normocephalic and atraumatic.   Eyes: Conjunctivae are normal. Right eye exhibits no discharge. Left eye exhibits no discharge.   Neck: Neck supple. No JVD present.   Cardiovascular: Normal rate and regular rhythm. Exam reveals no gallop and no friction rub.   No murmur heard.  Pulmonary/Chest: Effort normal and breath sounds normal. No respiratory distress. He has no wheezes. He has no rales.   Abdominal: Soft. Bowel sounds are normal. He exhibits no distension. There is no abdominal tenderness. There is no rebound.   Musculoskeletal:         General: No edema.   Neurological: He is alert and oriented to person, place, and time.   Skin: Skin is warm and dry. He is not diaphoretic.   Psychiatric: He has a normal mood and affect. Judgment and thought content normal.   Vitals reviewed.        Assessment:       1. Paroxysmal atrial fibrillation    2. Typical atrial flutter         Plan:       In summary, Mr. Krueger is a pleasant man with symptomatic paroxysmal atrial fibrillation and flutter (at times with 1:1 AV  conduction with aberrancy). We have had numerous discussions regarding AF and treatment options and was offered ablation strategy. He opted for anti-arrhythmic therapy.  His [id number] score is 0 and long-term anti-coagulation is currently not indicated. I also discussed the goal to reduce symptomatic arrhythmic episodes by pharmacologic and/or procedural methods and utilizing a rhythm versus a rate control strategy. He asked about PVI again as far as how it is done and the risks. I spent about a half hour discussing the nature of PVI including transseptal puncture. We discussed risks and benefits at length. Our discussion included, but was not limited to the risk of death, infection, bleeding, stroke, MI, cardiac perforation, embolism, cardiac tamponade, vascular injury, AE fistula, injury to phrenic nerve, pulmonary vein stenosis and other organic injury including the possibility for need for surgery or pacemaker implantation.  .  He is maintaining sinus rhythm on sotalol. He does not desire PVI currently. Need updated BMP.    RTC in 1 year, sooner if needed.    Thank you for allowing me to participate in the care of this patient. Please do not hesitate to call me with any questions or concerns.    Jorge Sage MD, PhD  Cardiac Electrophysiology

## 2021-10-01 ENCOUNTER — TELEPHONE (OUTPATIENT)
Dept: ELECTROPHYSIOLOGY | Facility: CLINIC | Age: 41
End: 2021-10-01

## 2021-10-01 ENCOUNTER — OFFICE VISIT (OUTPATIENT)
Dept: CARDIOLOGY | Facility: CLINIC | Age: 41
End: 2021-10-01
Payer: COMMERCIAL

## 2021-10-01 ENCOUNTER — LAB VISIT (OUTPATIENT)
Dept: LAB | Facility: HOSPITAL | Age: 41
End: 2021-10-01
Attending: INTERNAL MEDICINE
Payer: COMMERCIAL

## 2021-10-01 VITALS
BODY MASS INDEX: 30.97 KG/M2 | SYSTOLIC BLOOD PRESSURE: 143 MMHG | DIASTOLIC BLOOD PRESSURE: 83 MMHG | HEIGHT: 73 IN | HEART RATE: 59 BPM | WEIGHT: 233.69 LBS

## 2021-10-01 DIAGNOSIS — I48.3 TYPICAL ATRIAL FLUTTER: ICD-10-CM

## 2021-10-01 DIAGNOSIS — I49.8 OTHER SPECIFIED CARDIAC ARRHYTHMIAS: ICD-10-CM

## 2021-10-01 DIAGNOSIS — I48.0 PAROXYSMAL ATRIAL FIBRILLATION: Primary | ICD-10-CM

## 2021-10-01 DIAGNOSIS — I48.0 PAROXYSMAL ATRIAL FIBRILLATION: ICD-10-CM

## 2021-10-01 LAB
ANION GAP SERPL CALC-SCNC: 8 MMOL/L (ref 8–16)
BUN SERPL-MCNC: 13 MG/DL (ref 6–20)
CALCIUM SERPL-MCNC: 9.4 MG/DL (ref 8.7–10.5)
CHLORIDE SERPL-SCNC: 105 MMOL/L (ref 95–110)
CO2 SERPL-SCNC: 27 MMOL/L (ref 23–29)
CREAT SERPL-MCNC: 1 MG/DL (ref 0.5–1.4)
EST. GFR  (AFRICAN AMERICAN): >60 ML/MIN/1.73 M^2
EST. GFR  (NON AFRICAN AMERICAN): >60 ML/MIN/1.73 M^2
GLUCOSE SERPL-MCNC: 106 MG/DL (ref 70–110)
POTASSIUM SERPL-SCNC: 5 MMOL/L (ref 3.5–5.1)
SODIUM SERPL-SCNC: 140 MMOL/L (ref 136–145)

## 2021-10-01 PROCEDURE — 36415 COLL VENOUS BLD VENIPUNCTURE: CPT | Performed by: INTERNAL MEDICINE

## 2021-10-01 PROCEDURE — 93010 RHYTHM STRIP: ICD-10-PCS | Mod: S$GLB,,, | Performed by: INTERNAL MEDICINE

## 2021-10-01 PROCEDURE — 99999 PR PBB SHADOW E&M-EST. PATIENT-LVL III: CPT | Mod: PBBFAC,,, | Performed by: INTERNAL MEDICINE

## 2021-10-01 PROCEDURE — 93010 ELECTROCARDIOGRAM REPORT: CPT | Mod: S$GLB,,, | Performed by: INTERNAL MEDICINE

## 2021-10-01 PROCEDURE — 3077F PR MOST RECENT SYSTOLIC BLOOD PRESSURE >= 140 MM HG: ICD-10-PCS | Mod: CPTII,S$GLB,, | Performed by: INTERNAL MEDICINE

## 2021-10-01 PROCEDURE — 1160F PR REVIEW ALL MEDS BY PRESCRIBER/CLIN PHARMACIST DOCUMENTED: ICD-10-PCS | Mod: CPTII,S$GLB,, | Performed by: INTERNAL MEDICINE

## 2021-10-01 PROCEDURE — 1160F RVW MEDS BY RX/DR IN RCRD: CPT | Mod: CPTII,S$GLB,, | Performed by: INTERNAL MEDICINE

## 2021-10-01 PROCEDURE — 80048 BASIC METABOLIC PNL TOTAL CA: CPT | Performed by: INTERNAL MEDICINE

## 2021-10-01 PROCEDURE — 93005 RHYTHM STRIP: ICD-10-PCS | Mod: S$GLB,,, | Performed by: INTERNAL MEDICINE

## 2021-10-01 PROCEDURE — 1159F PR MEDICATION LIST DOCUMENTED IN MEDICAL RECORD: ICD-10-PCS | Mod: CPTII,S$GLB,, | Performed by: INTERNAL MEDICINE

## 2021-10-01 PROCEDURE — 1159F MED LIST DOCD IN RCRD: CPT | Mod: CPTII,S$GLB,, | Performed by: INTERNAL MEDICINE

## 2021-10-01 PROCEDURE — 3079F DIAST BP 80-89 MM HG: CPT | Mod: CPTII,S$GLB,, | Performed by: INTERNAL MEDICINE

## 2021-10-01 PROCEDURE — 99214 PR OFFICE/OUTPT VISIT, EST, LEVL IV, 30-39 MIN: ICD-10-PCS | Mod: S$GLB,,, | Performed by: INTERNAL MEDICINE

## 2021-10-01 PROCEDURE — 99214 OFFICE O/P EST MOD 30 MIN: CPT | Mod: S$GLB,,, | Performed by: INTERNAL MEDICINE

## 2021-10-01 PROCEDURE — 93005 ELECTROCARDIOGRAM TRACING: CPT | Mod: S$GLB,,, | Performed by: INTERNAL MEDICINE

## 2021-10-01 PROCEDURE — 3008F BODY MASS INDEX DOCD: CPT | Mod: CPTII,S$GLB,, | Performed by: INTERNAL MEDICINE

## 2021-10-01 PROCEDURE — 99999 PR PBB SHADOW E&M-EST. PATIENT-LVL III: ICD-10-PCS | Mod: PBBFAC,,, | Performed by: INTERNAL MEDICINE

## 2021-10-01 PROCEDURE — 3077F SYST BP >= 140 MM HG: CPT | Mod: CPTII,S$GLB,, | Performed by: INTERNAL MEDICINE

## 2021-10-01 PROCEDURE — 3008F PR BODY MASS INDEX (BMI) DOCUMENTED: ICD-10-PCS | Mod: CPTII,S$GLB,, | Performed by: INTERNAL MEDICINE

## 2021-10-01 PROCEDURE — 3079F PR MOST RECENT DIASTOLIC BLOOD PRESSURE 80-89 MM HG: ICD-10-PCS | Mod: CPTII,S$GLB,, | Performed by: INTERNAL MEDICINE

## 2022-10-04 ENCOUNTER — OFFICE VISIT (OUTPATIENT)
Dept: FAMILY MEDICINE | Facility: CLINIC | Age: 42
End: 2022-10-04
Payer: COMMERCIAL

## 2022-10-04 VITALS
DIASTOLIC BLOOD PRESSURE: 84 MMHG | BODY MASS INDEX: 30.88 KG/M2 | WEIGHT: 233 LBS | HEIGHT: 73 IN | SYSTOLIC BLOOD PRESSURE: 142 MMHG | OXYGEN SATURATION: 96 % | HEART RATE: 83 BPM

## 2022-10-04 DIAGNOSIS — Z12.5 PROSTATE CANCER SCREENING: ICD-10-CM

## 2022-10-04 DIAGNOSIS — I48.0 PAROXYSMAL ATRIAL FIBRILLATION: ICD-10-CM

## 2022-10-04 DIAGNOSIS — R03.0 ELEVATED BLOOD PRESSURE READING: ICD-10-CM

## 2022-10-04 DIAGNOSIS — Z00.00 ANNUAL PHYSICAL EXAM: Primary | ICD-10-CM

## 2022-10-04 PROCEDURE — 3077F SYST BP >= 140 MM HG: CPT | Mod: CPTII,S$GLB,, | Performed by: INTERNAL MEDICINE

## 2022-10-04 PROCEDURE — 99999 PR PBB SHADOW E&M-EST. PATIENT-LVL III: ICD-10-PCS | Mod: PBBFAC,,, | Performed by: INTERNAL MEDICINE

## 2022-10-04 PROCEDURE — 99396 PREV VISIT EST AGE 40-64: CPT | Mod: S$GLB,,, | Performed by: INTERNAL MEDICINE

## 2022-10-04 PROCEDURE — 99999 PR PBB SHADOW E&M-EST. PATIENT-LVL III: CPT | Mod: PBBFAC,,, | Performed by: INTERNAL MEDICINE

## 2022-10-04 PROCEDURE — 3079F DIAST BP 80-89 MM HG: CPT | Mod: CPTII,S$GLB,, | Performed by: INTERNAL MEDICINE

## 2022-10-04 PROCEDURE — 1160F PR REVIEW ALL MEDS BY PRESCRIBER/CLIN PHARMACIST DOCUMENTED: ICD-10-PCS | Mod: CPTII,S$GLB,, | Performed by: INTERNAL MEDICINE

## 2022-10-04 PROCEDURE — 3077F PR MOST RECENT SYSTOLIC BLOOD PRESSURE >= 140 MM HG: ICD-10-PCS | Mod: CPTII,S$GLB,, | Performed by: INTERNAL MEDICINE

## 2022-10-04 PROCEDURE — 3008F BODY MASS INDEX DOCD: CPT | Mod: CPTII,S$GLB,, | Performed by: INTERNAL MEDICINE

## 2022-10-04 PROCEDURE — 3008F PR BODY MASS INDEX (BMI) DOCUMENTED: ICD-10-PCS | Mod: CPTII,S$GLB,, | Performed by: INTERNAL MEDICINE

## 2022-10-04 PROCEDURE — 1159F MED LIST DOCD IN RCRD: CPT | Mod: CPTII,S$GLB,, | Performed by: INTERNAL MEDICINE

## 2022-10-04 PROCEDURE — 99396 PR PREVENTIVE VISIT,EST,40-64: ICD-10-PCS | Mod: S$GLB,,, | Performed by: INTERNAL MEDICINE

## 2022-10-04 PROCEDURE — 1160F RVW MEDS BY RX/DR IN RCRD: CPT | Mod: CPTII,S$GLB,, | Performed by: INTERNAL MEDICINE

## 2022-10-04 PROCEDURE — 3079F PR MOST RECENT DIASTOLIC BLOOD PRESSURE 80-89 MM HG: ICD-10-PCS | Mod: CPTII,S$GLB,, | Performed by: INTERNAL MEDICINE

## 2022-10-04 PROCEDURE — 1159F PR MEDICATION LIST DOCUMENTED IN MEDICAL RECORD: ICD-10-PCS | Mod: CPTII,S$GLB,, | Performed by: INTERNAL MEDICINE

## 2022-10-04 NOTE — PROGRESS NOTES
Subjective:       Patient ID: Fermin Krueger is a 42 y.o. male.    Chief Complaint: Establish Care      HPI  Fermin Krueger is a 42 y.o. male with chronic conditions of hyperlipidemia, paroxysmal atrial fibrillation, borderline hypertension who presents today to establish care.    Reports he has been and feeling well.  Has history of atrial fibrillation and is able to feel when has arrhythmias with occasional palpitations.  The palpitations are not as often since he is on the medication.  He is followed by Cardiology and ablation was consider but patient preferred to try medical treatment.    He exercises and feels good when he exercises.  His arrhythmias have been usually when he is relaxing.  Denies exertional symptoms.  Works in maintenance at [employer].    Has no toxic habits.  Likes sweets and tries to watch his diet with his history of high cholesterol.  He was able to improve his numbers with changes in diet.  Has gained weight and concerned that numbers have changed.      Health Maintenance:  Health Maintenance   Topic Date Due    Hepatitis C Screening  Never done    Lipid Panel  Never done    TETANUS VACCINE  Never done       Review of Systems   Constitutional: Negative.  Negative for fever and unexpected weight change.   HENT: Negative.     Eyes:  Positive for visual disturbance.   Respiratory: Negative.  Negative for apnea (snores).    Cardiovascular: Negative.    Gastrointestinal: Negative.    Genitourinary:  Negative for difficulty urinating.   Musculoskeletal: Negative.    Integumentary:  Negative.   Neurological: Negative.    Psychiatric/Behavioral:  Negative for sleep disturbance.     Past Medical History:   Diagnosis Date    Elevated cholesterol     HLD (hyperlipidemia)     Hypertension     Tachycardia        No past surgical history on file.    No family history on file.    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  0    sotaloL (BETAPACE) 80 MG tablet Take 1 tablet (80 mg total) by mouth 2 (two) times daily. 60 tablet 11     No current facility-administered medications for this visit.       Review of patient's allergies indicates:  No Known Allergies      Objective:       Last 3 sets of Vitals    Vitals - 1 value per visit 10/1/2021 10/4/2022 10/4/2022   SYSTOLIC 143 - 142   DIASTOLIC 83 - 84   Pulse 59 - 83   Temp - - -   Resp - - -   SPO2 - - 96   Weight (lb) 233.69 - 233.03   Weight (kg) 106 - 105.7   Height 73 - 73   BMI (Calculated) 30.8 - 30.8   VISIT REPORT - - -   Pain Score  - 0 -   Physical Exam  Constitutional:       General: He is not in acute distress.     Comments: Overweight   HENT:      Head: Normocephalic.      Right Ear: Tympanic membrane, ear canal and external ear normal.      Left Ear: Tympanic membrane, ear canal and external ear normal.      Nose: Nose normal.      Mouth/Throat:      Mouth: Mucous membranes are moist.      Pharynx: Oropharynx is clear.   Eyes:      General: No scleral icterus.     Extraocular Movements: Extraocular movements intact.      Conjunctiva/sclera: Conjunctivae normal.   Neck:      Vascular: No carotid bruit.      Comments: No goiter.  Cardiovascular:      Rate and Rhythm: Normal rate and regular rhythm.      Pulses: Normal pulses.      Heart sounds: Normal heart sounds.   Pulmonary:      Effort: Pulmonary effort is normal.      Breath sounds: Normal breath sounds.   Abdominal:      General: Bowel sounds are normal. There is no distension.      Palpations: Abdomen is soft. There  is no mass.      Tenderness: There is no abdominal tenderness.   Musculoskeletal:         General: No swelling.   Lymphadenopathy:      Cervical: No cervical adenopathy.   Skin:     General: Skin is warm and dry.   Neurological:      General: No focal deficit present.      Mental Status: He is alert and oriented to person, place, and time.   Psychiatric:         Mood and Affect: Mood normal.         Behavior: Behavior normal.         CBC:      CMP:  Recent Labs   Lab 10/01/21  0856   Glucose 106   Calcium 9.4   Sodium 140   Potassium 5.0   CO2 27   Chloride 105   BUN 13   Creatinine 1.0     URINALYSIS:       LIPIDS:      TSH:        A1C:        ImaginD echo with color flow doppler  Date of Procedure: 2018    TEST DESCRIPTION   Technical Quality: This is a technically adequate study.     Aorta: The aortic root is normal in size, measuring 3.2 cm at sinotubular junction.     Left Atrium: The left atrial volume index is normal, measuring 29.85 cc/m2.     Left Ventricle: The left ventricle is normal in size, with an end-diastolic diameter of 5.0 cm, and an end-systolic diameter of 3.1 cm. LV wall thickness is normal, with the septum and the posterior wall each measuring 0.8 cm across. Relative wall   thickness was normal at 0.32, and the LV mass index was 70.6 g/m2 consistent with normal left ventricular mass. There are no regional wall motion abnormalities. Left ventricular systolic function appears normal. Visually estimated ejection fraction is   60-65%. The LV Doppler derived stroke volume equals 75.0 ccs.     Diastolic indices: E wave velocity 0.7 m/s, E/A ratio 1.3,  msec., E/e' ratio(lat) 6. Diastolic function is normal.     Right Atrium: The right atrium is normal in size, measuring 5.1 cm in length in the apical view.     Right Ventricle: The right ventricle is normal in size measuring 3.4 cm at the base in the apical right ventricle-focused view. Global right ventricular systolic function  appears normal. The estimated PA systolic pressure is 20 mmHg.     Aortic Valve:  The aortic valve is normal in structure with normal leaflet mobility. The aortic valve is tri-leaflet in structure.     Mitral Valve:  The mitral valve is normal in structure with normal leaflet mobility.     Tricuspid Valve:  The tricuspid valve is normal in structure with normal leaflet mobility. There is trivial tricuspid regurgitation.     Pulmonary Valve:  The pulmonic valve is normal in structure.     IVC: IVC is normal in size and collapses > 50% with a sniff, suggesting normal right atrial pressure of 3 mmHg.     Atrial Septum: The atrial septum is intact.     Intracavitary: There is no evidence of pericardial effusion, intracavity mass, thrombi, or vegetation.     CONCLUSIONS     1 - Normal left ventricular systolic function (EF 60-65%).     2 - Normal left ventricular diastolic function.     3 - Normal right ventricular systolic function .     4 - The estimated PA systolic pressure is 20 mmHg.     This document has been electronically    SIGNED BY: Stephani Steinberg MD On: 07/18/2018 12:09      Assessment:       1. Annual physical exam    2. Paroxysmal atrial fibrillation    3. Elevated blood pressure reading    4. Prostate cancer screening            Plan:       1. Annual physical exam  -     CBC Auto Differential; Future; Expected date: 10/04/2022  -     Comprehensive Metabolic Panel; Future; Expected date: 10/04/2022  -     Hemoglobin A1C; Future; Expected date: 10/04/2022  -     Lipid Panel; Future; Expected date: 10/04/2022  -     TSH; Future; Expected date: 10/04/2022  - stable exam but mild elevation of the blood pressure and BMI.  Lifestyle modifications recommended.    2. Paroxysmal atrial fibrillation   - regular rhythm.  Followed by Cardiology.  On aspirin.    3. Elevated blood pressure reading   - Continue to monitor.  Will consider lisinopril to losartan if blood pressure remains elevated.    4. Prostate cancer  screening  -     PSA, Screening; Future; Expected date: 10/04/2022     Health Maintenance Due   Topic Date Due    Hepatitis C Screening  Never done    Lipid Panel  Never done    HIV Screening  Never done    TETANUS VACCINE  Never done    COVID-19 Vaccine (3 - Booster for Pfizer series) 04/28/2021    Influenza Vaccine (1) Never done            Return to clinic in 6 months.    Susana Gaitan MD  [facility]  Disclaimer:  This note has been generated using voice-recognition software. There may be grammatical or spelling errors that have been missed during proof-reading

## 2022-10-05 ENCOUNTER — TELEPHONE (OUTPATIENT)
Dept: ELECTROPHYSIOLOGY | Facility: CLINIC | Age: 42
End: 2022-10-05
Payer: COMMERCIAL

## 2022-10-07 ENCOUNTER — LAB VISIT (OUTPATIENT)
Dept: LAB | Facility: HOSPITAL | Age: 42
End: 2022-10-07
Attending: INTERNAL MEDICINE
Payer: COMMERCIAL

## 2022-10-07 DIAGNOSIS — Z00.00 ANNUAL PHYSICAL EXAM: ICD-10-CM

## 2022-10-07 DIAGNOSIS — Z12.5 PROSTATE CANCER SCREENING: ICD-10-CM

## 2022-10-07 LAB
ALBUMIN SERPL BCP-MCNC: 4 G/DL (ref 3.5–5.2)
ALP SERPL-CCNC: 61 U/L (ref 55–135)
ALT SERPL W/O P-5'-P-CCNC: 21 U/L (ref 10–44)
ANION GAP SERPL CALC-SCNC: 10 MMOL/L (ref 8–16)
AST SERPL-CCNC: 17 U/L (ref 10–40)
BASOPHILS # BLD AUTO: 0.03 K/UL (ref 0–0.2)
BASOPHILS NFR BLD: 0.5 % (ref 0–1.9)
BILIRUB SERPL-MCNC: 0.6 MG/DL (ref 0.1–1)
BUN SERPL-MCNC: 13 MG/DL (ref 6–20)
CALCIUM SERPL-MCNC: 9.5 MG/DL (ref 8.7–10.5)
CHLORIDE SERPL-SCNC: 105 MMOL/L (ref 95–110)
CHOLEST SERPL-MCNC: 203 MG/DL (ref 120–199)
CHOLEST/HDLC SERPL: 7.5 {RATIO} (ref 2–5)
CO2 SERPL-SCNC: 26 MMOL/L (ref 23–29)
COMPLEXED PSA SERPL-MCNC: 0.82 NG/ML (ref 0–4)
CREAT SERPL-MCNC: 1 MG/DL (ref 0.5–1.4)
DIFFERENTIAL METHOD: NORMAL
EOSINOPHIL # BLD AUTO: 0.3 K/UL (ref 0–0.5)
EOSINOPHIL NFR BLD: 4.2 % (ref 0–8)
ERYTHROCYTE [DISTWIDTH] IN BLOOD BY AUTOMATED COUNT: 12.8 % (ref 11.5–14.5)
EST. GFR  (NO RACE VARIABLE): >60 ML/MIN/1.73 M^2
ESTIMATED AVG GLUCOSE: 97 MG/DL (ref 68–131)
GLUCOSE SERPL-MCNC: 97 MG/DL (ref 70–110)
HBA1C MFR BLD: 5 % (ref 4–5.6)
HCT VFR BLD AUTO: 46.8 % (ref 40–54)
HDLC SERPL-MCNC: 27 MG/DL (ref 40–75)
HDLC SERPL: 13.3 % (ref 20–50)
HGB BLD-MCNC: 16.1 G/DL (ref 14–18)
IMM GRANULOCYTES # BLD AUTO: 0.02 K/UL (ref 0–0.04)
IMM GRANULOCYTES NFR BLD AUTO: 0.3 % (ref 0–0.5)
LDLC SERPL CALC-MCNC: ABNORMAL MG/DL (ref 63–159)
LYMPHOCYTES # BLD AUTO: 2.3 K/UL (ref 1–4.8)
LYMPHOCYTES NFR BLD: 38.4 % (ref 18–48)
MCH RBC QN AUTO: 29.2 PG (ref 27–31)
MCHC RBC AUTO-ENTMCNC: 34.4 G/DL (ref 32–36)
MCV RBC AUTO: 85 FL (ref 82–98)
MONOCYTES # BLD AUTO: 0.4 K/UL (ref 0.3–1)
MONOCYTES NFR BLD: 7.2 % (ref 4–15)
NEUTROPHILS # BLD AUTO: 2.9 K/UL (ref 1.8–7.7)
NEUTROPHILS NFR BLD: 49.4 % (ref 38–73)
NONHDLC SERPL-MCNC: 176 MG/DL
NRBC BLD-RTO: 0 /100 WBC
PLATELET # BLD AUTO: 213 K/UL (ref 150–450)
PMV BLD AUTO: 9.5 FL (ref 9.2–12.9)
POTASSIUM SERPL-SCNC: 4.7 MMOL/L (ref 3.5–5.1)
PROT SERPL-MCNC: 7.2 G/DL (ref 6–8.4)
RBC # BLD AUTO: 5.51 M/UL (ref 4.6–6.2)
SODIUM SERPL-SCNC: 141 MMOL/L (ref 136–145)
TRIGL SERPL-MCNC: 525 MG/DL (ref 30–150)
TSH SERPL DL<=0.005 MIU/L-ACNC: 3.41 UIU/ML (ref 0.4–4)
WBC # BLD AUTO: 5.96 K/UL (ref 3.9–12.7)

## 2022-10-07 PROCEDURE — 84153 ASSAY OF PSA TOTAL: CPT | Performed by: INTERNAL MEDICINE

## 2022-10-07 PROCEDURE — 83036 HEMOGLOBIN GLYCOSYLATED A1C: CPT | Performed by: INTERNAL MEDICINE

## 2022-10-07 PROCEDURE — 80061 LIPID PANEL: CPT | Performed by: INTERNAL MEDICINE

## 2022-10-07 PROCEDURE — 85025 COMPLETE CBC W/AUTO DIFF WBC: CPT | Performed by: INTERNAL MEDICINE

## 2022-10-07 PROCEDURE — 36415 COLL VENOUS BLD VENIPUNCTURE: CPT | Performed by: INTERNAL MEDICINE

## 2022-10-07 PROCEDURE — 80053 COMPREHEN METABOLIC PANEL: CPT | Performed by: INTERNAL MEDICINE

## 2022-10-07 PROCEDURE — 84443 ASSAY THYROID STIM HORMONE: CPT | Performed by: INTERNAL MEDICINE

## 2022-10-09 ENCOUNTER — PATIENT MESSAGE (OUTPATIENT)
Dept: FAMILY MEDICINE | Facility: CLINIC | Age: 42
End: 2022-10-09
Payer: COMMERCIAL

## 2022-10-09 DIAGNOSIS — E78.2 HYPERLIPIDEMIA, MIXED: Primary | ICD-10-CM

## 2022-10-09 RX ORDER — ATORVASTATIN CALCIUM 20 MG/1
20 TABLET, FILM COATED ORAL DAILY
Qty: 90 TABLET | Refills: 3 | Status: SHIPPED | OUTPATIENT
Start: 2022-10-09 | End: 2023-10-09

## 2023-01-20 ENCOUNTER — OFFICE VISIT (OUTPATIENT)
Dept: CARDIOLOGY | Facility: CLINIC | Age: 43
End: 2023-01-20
Payer: COMMERCIAL

## 2023-01-20 VITALS
SYSTOLIC BLOOD PRESSURE: 123 MMHG | BODY MASS INDEX: 30.53 KG/M2 | WEIGHT: 231.38 LBS | DIASTOLIC BLOOD PRESSURE: 84 MMHG | HEART RATE: 65 BPM

## 2023-01-20 DIAGNOSIS — I48.3 TYPICAL ATRIAL FLUTTER: ICD-10-CM

## 2023-01-20 DIAGNOSIS — I48.0 PAROXYSMAL ATRIAL FIBRILLATION: Primary | ICD-10-CM

## 2023-01-20 PROCEDURE — 1160F RVW MEDS BY RX/DR IN RCRD: CPT | Mod: CPTII,S$GLB,, | Performed by: INTERNAL MEDICINE

## 2023-01-20 PROCEDURE — 99999 PR PBB SHADOW E&M-EST. PATIENT-LVL III: CPT | Mod: PBBFAC,,, | Performed by: INTERNAL MEDICINE

## 2023-01-20 PROCEDURE — 99214 OFFICE O/P EST MOD 30 MIN: CPT | Mod: S$GLB,,, | Performed by: INTERNAL MEDICINE

## 2023-01-20 PROCEDURE — 99214 PR OFFICE/OUTPT VISIT, EST, LEVL IV, 30-39 MIN: ICD-10-PCS | Mod: S$GLB,,, | Performed by: INTERNAL MEDICINE

## 2023-01-20 PROCEDURE — 3074F PR MOST RECENT SYSTOLIC BLOOD PRESSURE < 130 MM HG: ICD-10-PCS | Mod: CPTII,S$GLB,, | Performed by: INTERNAL MEDICINE

## 2023-01-20 PROCEDURE — 3008F BODY MASS INDEX DOCD: CPT | Mod: CPTII,S$GLB,, | Performed by: INTERNAL MEDICINE

## 2023-01-20 PROCEDURE — 3079F PR MOST RECENT DIASTOLIC BLOOD PRESSURE 80-89 MM HG: ICD-10-PCS | Mod: CPTII,S$GLB,, | Performed by: INTERNAL MEDICINE

## 2023-01-20 PROCEDURE — 99999 PR PBB SHADOW E&M-EST. PATIENT-LVL III: ICD-10-PCS | Mod: PBBFAC,,, | Performed by: INTERNAL MEDICINE

## 2023-01-20 PROCEDURE — 3008F PR BODY MASS INDEX (BMI) DOCUMENTED: ICD-10-PCS | Mod: CPTII,S$GLB,, | Performed by: INTERNAL MEDICINE

## 2023-01-20 PROCEDURE — 1159F PR MEDICATION LIST DOCUMENTED IN MEDICAL RECORD: ICD-10-PCS | Mod: CPTII,S$GLB,, | Performed by: INTERNAL MEDICINE

## 2023-01-20 PROCEDURE — 1160F PR REVIEW ALL MEDS BY PRESCRIBER/CLIN PHARMACIST DOCUMENTED: ICD-10-PCS | Mod: CPTII,S$GLB,, | Performed by: INTERNAL MEDICINE

## 2023-01-20 PROCEDURE — 1159F MED LIST DOCD IN RCRD: CPT | Mod: CPTII,S$GLB,, | Performed by: INTERNAL MEDICINE

## 2023-01-20 PROCEDURE — 3074F SYST BP LT 130 MM HG: CPT | Mod: CPTII,S$GLB,, | Performed by: INTERNAL MEDICINE

## 2023-01-20 PROCEDURE — 3079F DIAST BP 80-89 MM HG: CPT | Mod: CPTII,S$GLB,, | Performed by: INTERNAL MEDICINE

## 2023-01-20 NOTE — PROGRESS NOTES
Subjective:    Patient ID:  Fermin Krueger is a 42 y.o. male who presents for evaluation of Atrial Fibrillation    Referring Cardiac Practitioner: Ilsa Summers NP    HPI   Prior Hx:  I had the pleasure of seeing Mr. Krueger today in our electrophysiology clinic in consultation for his atrial arrhythmia. He presented to [facility] on 7/17/2018 with complaint of palpitations after completing a 12 hour work shift. His ECG was consistent with a right bundle/wide complex tachycardia at 260 bpm. He was given IV diltiazem and rate slowed revealing what is consistent with typical atrial flutter and variable AV block. The next ECG was consistent with atrial fibrillation and then another later with sinus rhythm. He felt better. ECHO noted preserved LVEF. TSH was normal.    He reports an episode 1 year ago that lasted 5 minutes. He went to [facility]. Stress test was normal. 14 day event monitor was unremarkable. He notes occasional short palpitations.    Both episodes occurred after eating a large meal.    ECHO 7/2018 CONCLUSIONS     1 - Normal left ventricular systolic function (EF 60-65%).     2 - Normal left ventricular diastolic function.     3 - Normal right ventricular systolic function .     4 - The estimated PA systolic pressure is 20 mmHg.    After discussion options including offering PVI, Mr. Krueger elected for anti-arrhythmic therapy (propafenone/diltiazem). He presented for 3 month follow-up 10/2018 and felt well. He has had no symptomatic AF. He saw Dr. Rosado at [facility] for a second opinion.    Mr. Krueger returned for follow-up visit 10/2019. He reported he had been doing well without any symptomatic arrhythmia recurrences. He stated he was still not ready for the ablation.  He desired a cheaper anti-arrhythmic drug. We changed to sotalol.    Mr. Krueger returned for follow-up 9/2020. He denied any symptomatic AF recurrences on sotalol.    10/2021: Mr. Krueger returned for 1 year follow-up. He has  had no symptomatic AF recurrences in the past year.    Interim Hx:  Mr. Krueger returns for follow-up. He feels great. Notes only occasional skipped beats.     My interpretation of today's in clinic ECG is sinus rhythm at 74 bpm with QTc of 435ms      Review of Systems   Constitutional: Negative for fever and malaise/fatigue.   HENT:  Negative for congestion and sore throat.    Eyes:  Negative for blurred vision and visual disturbance.   Cardiovascular:  Positive for palpitations. Negative for dyspnea on exertion, irregular heartbeat, leg swelling, near-syncope, orthopnea and paroxysmal nocturnal dyspnea.   Respiratory:  Negative for cough.    Hematologic/Lymphatic: Negative for bleeding problem. Does not bruise/bleed easily.   Skin: Negative.    Musculoskeletal: Negative.    Gastrointestinal:  Negative for bloating and abdominal pain.   Neurological:  Negative for focal weakness.      Objective:    Physical Exam  Vitals reviewed.   Constitutional:       General: He is not in acute distress.     Appearance: He is well-developed. He is not diaphoretic.   HENT:      Head: Normocephalic and atraumatic.   Eyes:      General:         Right eye: No discharge.         Left eye: No discharge.      Conjunctiva/sclera: Conjunctivae normal.   Neck:      Vascular: No JVD.   Cardiovascular:      Rate and Rhythm: Normal rate and regular rhythm.      Heart sounds: No murmur heard.    No friction rub. No gallop.   Pulmonary:      Effort: Pulmonary effort is normal. No respiratory distress.      Breath sounds: Normal breath sounds. No wheezing or rales.   Abdominal:      General: Bowel sounds are normal. There is no distension.      Palpations: Abdomen is soft.      Tenderness: There is no abdominal tenderness. There is no rebound.   Musculoskeletal:      Cervical back: Neck supple.   Skin:     General: Skin is warm and dry.   Neurological:      Mental Status: He is alert and oriented to person, place, and time.   Psychiatric:          Thought Content: Thought content normal.         Judgment: Judgment normal.         Assessment:       1. Paroxysmal atrial fibrillation    2. Typical atrial flutter         Plan:       In summary, Mr. Krueger is a pleasant man with symptomatic paroxysmal atrial fibrillation and flutter (at times with 1:1 AV conduction with aberrancy). We have had numerous discussions regarding AF and treatment options and was offered ablation strategy. He opted for anti-arrhythmic therapy.  His [id number] score is 0 and long-term anti-coagulation is currently not indicated. We have discussed ablation vs drug therapy. He elects to continue sotalol. He is doing well. Recent BMP was normal.    RTC in 1 year, sooner if needed.    Thank you for allowing me to participate in the care of this patient. Please do not hesitate to call me with any questions or concerns.    Jorge Sage MD, PhD  Cardiac Electrophysiology

## 2023-04-04 ENCOUNTER — OFFICE VISIT (OUTPATIENT)
Dept: FAMILY MEDICINE | Facility: CLINIC | Age: 43
End: 2023-04-04
Payer: COMMERCIAL

## 2023-04-04 ENCOUNTER — PATIENT MESSAGE (OUTPATIENT)
Dept: FAMILY MEDICINE | Facility: CLINIC | Age: 43
End: 2023-04-04
Payer: COMMERCIAL

## 2023-04-04 ENCOUNTER — LAB VISIT (OUTPATIENT)
Dept: LAB | Facility: HOSPITAL | Age: 43
End: 2023-04-04
Attending: INTERNAL MEDICINE
Payer: COMMERCIAL

## 2023-04-04 VITALS
BODY MASS INDEX: 30.76 KG/M2 | WEIGHT: 232.13 LBS | DIASTOLIC BLOOD PRESSURE: 68 MMHG | HEART RATE: 66 BPM | SYSTOLIC BLOOD PRESSURE: 116 MMHG | HEIGHT: 73 IN | OXYGEN SATURATION: 98 %

## 2023-04-04 DIAGNOSIS — I48.92 ATRIAL FLUTTER, UNSPECIFIED TYPE: ICD-10-CM

## 2023-04-04 DIAGNOSIS — I48.0 PAROXYSMAL ATRIAL FIBRILLATION: ICD-10-CM

## 2023-04-04 DIAGNOSIS — R21 RASH: ICD-10-CM

## 2023-04-04 DIAGNOSIS — E78.2 HYPERLIPIDEMIA, MIXED: ICD-10-CM

## 2023-04-04 DIAGNOSIS — E66.9 OBESITY (BMI 30-39.9): ICD-10-CM

## 2023-04-04 DIAGNOSIS — E78.2 HYPERLIPIDEMIA, MIXED: Primary | ICD-10-CM

## 2023-04-04 LAB
CHOLEST SERPL-MCNC: 151 MG/DL (ref 120–199)
CHOLEST/HDLC SERPL: 6 {RATIO} (ref 2–5)
HDLC SERPL-MCNC: 25 MG/DL (ref 40–75)
HDLC SERPL: 16.6 % (ref 20–50)
LDLC SERPL CALC-MCNC: 69.6 MG/DL (ref 63–159)
NONHDLC SERPL-MCNC: 126 MG/DL
TRIGL SERPL-MCNC: 282 MG/DL (ref 30–150)

## 2023-04-04 PROCEDURE — 99999 PR PBB SHADOW E&M-EST. PATIENT-LVL IV: ICD-10-PCS | Mod: PBBFAC,,, | Performed by: INTERNAL MEDICINE

## 2023-04-04 PROCEDURE — 3078F PR MOST RECENT DIASTOLIC BLOOD PRESSURE < 80 MM HG: ICD-10-PCS | Mod: CPTII,S$GLB,, | Performed by: INTERNAL MEDICINE

## 2023-04-04 PROCEDURE — 1160F RVW MEDS BY RX/DR IN RCRD: CPT | Mod: CPTII,S$GLB,, | Performed by: INTERNAL MEDICINE

## 2023-04-04 PROCEDURE — 3074F SYST BP LT 130 MM HG: CPT | Mod: CPTII,S$GLB,, | Performed by: INTERNAL MEDICINE

## 2023-04-04 PROCEDURE — 1160F PR REVIEW ALL MEDS BY PRESCRIBER/CLIN PHARMACIST DOCUMENTED: ICD-10-PCS | Mod: CPTII,S$GLB,, | Performed by: INTERNAL MEDICINE

## 2023-04-04 PROCEDURE — 3074F PR MOST RECENT SYSTOLIC BLOOD PRESSURE < 130 MM HG: ICD-10-PCS | Mod: CPTII,S$GLB,, | Performed by: INTERNAL MEDICINE

## 2023-04-04 PROCEDURE — 3008F BODY MASS INDEX DOCD: CPT | Mod: CPTII,S$GLB,, | Performed by: INTERNAL MEDICINE

## 2023-04-04 PROCEDURE — 3078F DIAST BP <80 MM HG: CPT | Mod: CPTII,S$GLB,, | Performed by: INTERNAL MEDICINE

## 2023-04-04 PROCEDURE — 1159F PR MEDICATION LIST DOCUMENTED IN MEDICAL RECORD: ICD-10-PCS | Mod: CPTII,S$GLB,, | Performed by: INTERNAL MEDICINE

## 2023-04-04 PROCEDURE — 3008F PR BODY MASS INDEX (BMI) DOCUMENTED: ICD-10-PCS | Mod: CPTII,S$GLB,, | Performed by: INTERNAL MEDICINE

## 2023-04-04 PROCEDURE — 36415 COLL VENOUS BLD VENIPUNCTURE: CPT | Performed by: INTERNAL MEDICINE

## 2023-04-04 PROCEDURE — 1159F MED LIST DOCD IN RCRD: CPT | Mod: CPTII,S$GLB,, | Performed by: INTERNAL MEDICINE

## 2023-04-04 PROCEDURE — 99214 PR OFFICE/OUTPT VISIT, EST, LEVL IV, 30-39 MIN: ICD-10-PCS | Mod: S$GLB,,, | Performed by: INTERNAL MEDICINE

## 2023-04-04 PROCEDURE — 80061 LIPID PANEL: CPT | Performed by: INTERNAL MEDICINE

## 2023-04-04 PROCEDURE — 99999 PR PBB SHADOW E&M-EST. PATIENT-LVL IV: CPT | Mod: PBBFAC,,, | Performed by: INTERNAL MEDICINE

## 2023-04-04 PROCEDURE — 99214 OFFICE O/P EST MOD 30 MIN: CPT | Mod: S$GLB,,, | Performed by: INTERNAL MEDICINE

## 2023-04-04 RX ORDER — AMOXICILLIN 500 MG
1 CAPSULE ORAL 2 TIMES DAILY
COMMUNITY

## 2023-04-04 RX ORDER — CLOTRIMAZOLE AND BETAMETHASONE DIPROPIONATE 10; .64 MG/G; MG/G
CREAM TOPICAL 2 TIMES DAILY
Qty: 45 G | Refills: 2 | Status: SHIPPED | OUTPATIENT
Start: 2023-04-04

## 2023-04-04 NOTE — PROGRESS NOTES
Subjective:       Patient ID: Fermin Krueger is a 42 y.o. male.    Chief Complaint: Follow-up      HPI  Fermin Krueger is a 42 y.o. male with chronic conditions of hyperlipidemia, paroxysmal atrial fibrillation, borderline hypertension  who presents today for follow-up.    Reports he has been and feeling well.  Has history of atrial fibrillation/flutter and reports since he is on the sotalol his palpitations or arrhythmias have improved.  Occasionally will have 1 or 2 seconds of a sensation in his chest that happens at rest. He followed with Cardiology and ablation was consider but patient continues to prefer medical treatment.    Most of his labs came back within normal limits but lipid profile with hypertriglyceridemia, unable to measure LDL, and low HDL.  Atorvastatin was restarted and to re-evaluate after a few weeks of diet modifications.  Reports is hard for him to follow a diet.  Does not live alone, parents are wife are not in a diet and give sent to rice and other foods.  We discussed briefly intermittent fasting.    In the last couple of months have been noticing a rash in the center of his chest.  Is pruritic and tends to get worse with heat and sweat.  Has applied alcohol without improvement.  No other rash.    Has no toxic habits.    Health Maintenance:  Health Maintenance   Topic Date Due    Hepatitis C Screening  Never done    TETANUS VACCINE  Never done    Lipid Panel  04/04/2028       Review of Systems   Constitutional: Negative.  Negative for fever and unexpected weight change.   HENT: Negative.     Respiratory: Negative.     Cardiovascular:  Positive for palpitations.   Gastrointestinal: Negative.    Genitourinary:  Negative for difficulty urinating.   Musculoskeletal: Negative.    Integumentary:  Positive for rash.   Neurological: Negative.    Psychiatric/Behavioral: Negative.      Past Medical History:   Diagnosis Date    Elevated cholesterol     HLD (hyperlipidemia)      Hypertension     PAF (paroxysmal atrial fibrillation)     Tachycardia        No past surgical history on file.    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  0    atorvastatin (LIPITOR) 20 MG tablet Take 1 tablet (20 mg total) by mouth once daily. 90 tablet 3    omega-3 fatty acids/fish oil (FISH OIL-OMEGA-3 FATTY ACIDS) 300-1,000 mg capsule Take 1 capsule by mouth 2 (two) times a day.      sotaloL (BETAPACE) 80 MG tablet TAKE 1 TABLET(80 MG) BY MOUTH TWICE DAILY 60 tablet 11    clotrimazole-betamethasone 1-0.05% (LOTRISONE) cream Apply topically 2 (two) times daily. 45 g 2     No current facility-administered medications for this visit.       Review of patient's allergies indicates:  No Known Allergies      Objective:       Last 3 sets of Vitals    Vitals - 1 value per visit 1/20/2023 4/4/2023 4/4/2023   SYSTOLIC 123 - 116   DIASTOLIC 84 - 68   Pulse 65 - 66   Temp - - -   Resp - - -   SPO2 - - 98   Weight (lb) 231.37 - 232.14   Weight (kg) 104.95 - 105.3   Height - - 73   BMI (Calculated) - - 30.6   VISIT REPORT - - -   Pain Score  - 0 -   Physical Exam  Constitutional:       General: He is not in acute distress.     Appearance: Normal appearance.   HENT:      Head:  Normocephalic.   Eyes:      General: No scleral icterus.     Extraocular Movements: Extraocular movements intact.      Conjunctiva/sclera: Conjunctivae normal.   Neck:      Vascular: No carotid bruit.      Comments: No goiter.  Cardiovascular:      Rate and Rhythm: Normal rate and regular rhythm.      Pulses: Normal pulses.      Heart sounds: Normal heart sounds.   Pulmonary:      Effort: Pulmonary effort is normal.      Breath sounds: Normal breath sounds.   Abdominal:      General: Bowel sounds are normal. There is no distension.      Palpations: Abdomen is soft. There is no mass.      Tenderness: There is no abdominal tenderness.   Musculoskeletal:         General: No swelling. Normal range of motion.   Lymphadenopathy:      Cervical: No cervical adenopathy.   Skin:     General: Skin is warm and dry.      Findings: Rash (Slight papular patch, folic colitis like, with no significant erythema around it, tenderness, or warmth) present.   Neurological:      General: No focal deficit present.      Mental Status: He is alert and oriented to person, place, and time.   Psychiatric:         Mood and Affect: Mood normal.         Behavior: Behavior normal.         CBC:  Recent Labs   Lab 10/07/22  0700   WBC 5.96   RBC 5.51   Hemoglobin 16.1   Hematocrit 46.8   Platelets 213   MCV 85   MCH 29.2   MCHC 34.4     CMP:  Recent Labs   Lab 10/07/22  0700   Glucose 97   Calcium 9.5   Albumin 4.0   Total Protein 7.2   Sodium 141   Potassium 4.7   CO2 26   Chloride 105   BUN 13   Creatinine 1.0   Alkaline Phosphatase 61   ALT 21   AST 17   Total Bilirubin 0.6     URINALYSIS:       LIPIDS:  Recent Labs   Lab 10/07/22  0700 04/04/23  1500   TSH 3.411  --    HDL 27 L 25 L   Cholesterol 203 H 151   Triglycerides 525 H 282 H   LDL Cholesterol Invalid, Trig>400.0 69.6   HDL/Cholesterol Ratio 13.3 L 16.6 L   Non-HDL Cholesterol 176 126   Total Cholesterol/HDL Ratio 7.5 H 6.0 H     TSH:  Recent Labs   Lab 10/07/22  0700   TSH 3.411        A1C:  Recent Labs   Lab 10/07/22  0700   Hemoglobin A1C 5.0       ImaginD echo with color flow doppler  Date of Procedure: 2018    TEST DESCRIPTION   Technical Quality: This is a technically adequate study.     Aorta: The aortic root is normal in size, measuring 3.2 cm at sinotubular junction.     Left Atrium: The left atrial volume index is normal, measuring 29.85 cc/m2.     Left Ventricle: The left ventricle is normal in size, with an end-diastolic diameter of 5.0 cm, and an end-systolic diameter of 3.1 cm. LV wall thickness is normal, with the septum and the posterior wall each measuring 0.8 cm across. Relative wall   thickness was normal at 0.32, and the LV mass index was 70.6 g/m2 consistent with normal left ventricular mass. There are no regional wall motion abnormalities. Left ventricular systolic function appears normal. Visually estimated ejection fraction is   60-65%. The LV Doppler derived stroke volume equals 75.0 ccs.     Diastolic indices: E wave velocity 0.7 m/s, E/A ratio 1.3,  msec., E/e' ratio(lat) 6. Diastolic function is normal.     Right Atrium: The right atrium is normal in size, measuring 5.1 cm in length in the apical view.     Right Ventricle: The right ventricle is normal in size measuring 3.4 cm at the base in the apical right ventricle-focused view. Global right ventricular systolic function appears normal. The estimated PA systolic pressure is 20 mmHg.     Aortic Valve:  The aortic valve is normal in structure with normal leaflet mobility. The aortic valve is tri-leaflet in structure.     Mitral Valve:  The mitral valve is normal in structure with normal leaflet mobility.     Tricuspid Valve:  The tricuspid valve is normal in structure with normal leaflet mobility. There is trivial tricuspid regurgitation.     Pulmonary Valve:  The pulmonic valve is normal in structure.     IVC: IVC is normal in size and collapses > 50% with a sniff, suggesting normal right atrial  pressure of 3 mmHg.     Atrial Septum: The atrial septum is intact.     Intracavitary: There is no evidence of pericardial effusion, intracavity mass, thrombi, or vegetation.     CONCLUSIONS     1 - Normal left ventricular systolic function (EF 60-65%).     2 - Normal left ventricular diastolic function.     3 - Normal right ventricular systolic function .     4 - The estimated PA systolic pressure is 20 mmHg.     This document has been electronically    SIGNED BY: Stephani Steinberg MD On: 07/18/2018 12:09      Assessment:       1. Hyperlipidemia, mixed    2. Rash    3. Paroxysmal atrial fibrillation    4. Atrial flutter, unspecified type    5. Obesity (BMI 30-39.9)            Plan:       1. Hyperlipidemia, mixed  -     Lipid Panel; Future; Expected date: 04/04/2023  - Total cholesterol and LDL much improved with atorvastatin.  - Triglycerides still elevated and the HDL low.  On Omega 3 fatty acid.    2. Rash  -     appears more follicular lytic but without much inflammation.  Associates rash to exposure to plants but affected side not as exposed.  - clotrimazole-betamethasone 1-0.05% (LOTRISONE) cream; Apply topically 2 (two) times daily.  Dispense: 45 g; Refill: 2  -     Ambulatory referral/consult to Dermatology; Future; Expected date: 04/04/2023    3. Paroxysmal atrial fibrillation   - Follows with cardiology and opted for medical treatment.  Symptoms have improved with rare and short episodes.    4. Atrial flutter, unspecified type   - As above    5. Obesity (BMI 30-39.9)   - Continue lifestyle modifications with routine exercise, healthy diet (low-fat, low processed food, low simple carbs), and weight loss.     Health Maintenance Due   Topic Date Due    Hepatitis C Screening  Never done    HIV Screening  Never done    TETANUS VACCINE  Never done    COVID-19 Vaccine (3 - Booster for Pfizer series) 04/28/2021    Influenza Vaccine (1) Never done            Return to clinic in 3 months.    Susana Gaitan MD  [facility]  Disclaimer:  This note has been generated using voice-recognition software. There may be grammatical or spelling errors that have been missed during proof-reading

## 2023-11-01 RX ORDER — SOTALOL HYDROCHLORIDE 80 MG/1
TABLET ORAL
Qty: 60 TABLET | Refills: 11 | Status: SHIPPED | OUTPATIENT
Start: 2023-11-01

## 2024-10-23 DIAGNOSIS — I48.0 PAROXYSMAL ATRIAL FIBRILLATION: Primary | ICD-10-CM

## 2024-10-27 ENCOUNTER — PATIENT MESSAGE (OUTPATIENT)
Dept: CARDIOLOGY | Facility: CLINIC | Age: 44
End: 2024-10-27
Payer: COMMERCIAL

## 2024-11-07 DIAGNOSIS — I48.0 PAROXYSMAL ATRIAL FIBRILLATION: Primary | ICD-10-CM

## 2024-11-11 ENCOUNTER — HOSPITAL ENCOUNTER (OUTPATIENT)
Dept: CARDIOLOGY | Facility: CLINIC | Age: 44
Discharge: HOME | End: 2024-11-11
Payer: COMMERCIAL

## 2024-11-11 DIAGNOSIS — I48.0 PAROXYSMAL ATRIAL FIBRILLATION: ICD-10-CM

## 2024-11-11 LAB
OHS QRS DURATION: 104 MS
OHS QTC CALCULATION: 424 MS

## 2024-11-11 PROCEDURE — 93005 ELECTROCARDIOGRAM TRACING: CPT | Mod: S$GLB,,, | Performed by: INTERNAL MEDICINE

## 2024-11-11 PROCEDURE — 93010 ELECTROCARDIOGRAM REPORT: CPT | Mod: S$GLB,,, | Performed by: STUDENT IN AN ORGANIZED HEALTH CARE EDUCATION/TRAINING PROGRAM

## 2024-12-05 ENCOUNTER — TELEPHONE (OUTPATIENT)
Dept: ELECTROPHYSIOLOGY | Facility: CLINIC | Age: 44
End: 2024-12-05
Payer: COMMERCIAL

## 2024-12-06 ENCOUNTER — OFFICE VISIT (OUTPATIENT)
Dept: CARDIOLOGY | Facility: CLINIC | Age: 44
End: 2024-12-06
Payer: COMMERCIAL

## 2024-12-06 VITALS
WEIGHT: 235 LBS | HEART RATE: 71 BPM | DIASTOLIC BLOOD PRESSURE: 79 MMHG | SYSTOLIC BLOOD PRESSURE: 143 MMHG | HEIGHT: 73 IN | BODY MASS INDEX: 31.14 KG/M2

## 2024-12-06 DIAGNOSIS — I48.0 PAROXYSMAL ATRIAL FIBRILLATION: Primary | ICD-10-CM

## 2024-12-06 PROCEDURE — 99999 PR PBB SHADOW E&M-EST. PATIENT-LVL III: CPT | Mod: PBBFAC,,, | Performed by: INTERNAL MEDICINE

## 2024-12-06 RX ORDER — SOTALOL HYDROCHLORIDE 80 MG/1
80 TABLET ORAL 2 TIMES DAILY
Qty: 180 TABLET | Refills: 3 | Status: SHIPPED | OUTPATIENT
Start: 2024-12-06

## 2024-12-06 NOTE — PROGRESS NOTES
Subjective:    Patient ID:  Fermin Krueger is a 44 y.o. male who presents for evaluation of Atrial Fibrillation    Referring Cardiac Practitioner: Ilsa Summers NP    HPI   Prior Hx:  I had the pleasure of seeing Mr. Krueger today in our electrophysiology clinic in consultation for his atrial arrhythmia. He presented to [facility] on 7/17/2018 with complaint of palpitations after completing a 12 hour work shift. His ECG was consistent with a right bundle/wide complex tachycardia at 260 bpm. He was given IV diltiazem and rate slowed revealing what is consistent with typical atrial flutter and variable AV block. The next ECG was consistent with atrial fibrillation and then another later with sinus rhythm. He felt better. ECHO noted preserved LVEF. TSH was normal.    He reports an episode 1 year ago that lasted 5 minutes. He went to [facility]. Stress test was normal. 14 day event monitor was unremarkable. He notes occasional short palpitations.    Both episodes occurred after eating a large meal.    ECHO 7/2018 CONCLUSIONS     1 - Normal left ventricular systolic function (EF 60-65%).     2 - Normal left ventricular diastolic function.     3 - Normal right ventricular systolic function .     4 - The estimated PA systolic pressure is 20 mmHg.    After discussion options including offering PVI, Mr. Krueger elected for anti-arrhythmic therapy (propafenone/diltiazem). He presented for 3 month follow-up 10/2018 and felt well. He has had no symptomatic AF. He saw Dr. Rosado at [facility] for a second opinion.    Mr. Krueger returned for follow-up visit 10/2019. He reported he had been doing well without any symptomatic arrhythmia recurrences. He stated he was still not ready for the ablation.  He desired a cheaper anti-arrhythmic drug. We changed to sotalol.    Mr. Krueger returned for follow-up 9/2020. He denied any symptomatic AF recurrences on sotalol.    10/2021: Mr. Krueger returned for 1 year follow-up. He has  had no symptomatic AF recurrences in the past year.    1/2023: Mr. Krueger returns for follow-up. He feels great. Notes only occasional skipped beats.     Interim Hx:  Mr Krueger returns for follow-up. A few months ago he changed to taking sotalol once at night. Did ok for 3 months until a few weeks again when he developed AF. Started taking sotalol twice daily and went back into normal rhythm.    My interpretation of today's in clinic ECG is sinus rhythm at 71 bpm with QTc of 412ms      Review of Systems   Constitutional: Negative for fever and malaise/fatigue.   HENT:  Negative for congestion and sore throat.    Eyes:  Negative for blurred vision and visual disturbance.   Cardiovascular:  Positive for palpitations. Negative for dyspnea on exertion, irregular heartbeat, leg swelling, near-syncope, orthopnea and paroxysmal nocturnal dyspnea.   Respiratory:  Negative for cough.    Hematologic/Lymphatic: Negative for bleeding problem. Does not bruise/bleed easily.   Skin: Negative.    Musculoskeletal: Negative.    Gastrointestinal:  Negative for bloating and abdominal pain.   Neurological:  Negative for focal weakness.        Objective:    Physical Exam  Vitals reviewed.   Constitutional:       General: He is not in acute distress.     Appearance: He is well-developed. He is not diaphoretic.   HENT:      Head: Normocephalic and atraumatic.   Eyes:      General:         Right eye: No discharge.         Left eye: No discharge.      Conjunctiva/sclera: Conjunctivae normal.   Neck:      Vascular: No JVD.   Cardiovascular:      Rate and Rhythm: Normal rate and regular rhythm.      Heart sounds: No murmur heard.     No friction rub. No gallop.   Pulmonary:      Effort: Pulmonary effort is normal. No respiratory distress.      Breath sounds: Normal breath sounds. No wheezing or rales.   Abdominal:      General: Bowel sounds are normal. There is no distension.      Palpations: Abdomen is soft.      Tenderness: There is no  abdominal tenderness. There is no rebound.   Musculoskeletal:      Cervical back: Neck supple.   Skin:     General: Skin is warm and dry.   Neurological:      Mental Status: He is alert and oriented to person, place, and time.   Psychiatric:         Thought Content: Thought content normal.         Judgment: Judgment normal.           Assessment:       1. Paroxysmal atrial fibrillation         Plan:       In summary, Mr. Krueger is a pleasant man with symptomatic paroxysmal atrial fibrillation and flutter (at times with 1:1 AV conduction with aberrancy). We have had numerous discussions regarding AF and treatment options and was offered ablation strategy. He opted for anti-arrhythmic therapy.  His [id number] score is 0 and long-term anti-coagulation is currently not indicated. We have discussed ablation vs drug therapy. He elects to continue sotalol. He is doing well. Needs BMP.    RTC in 1 year, sooner if needed.    Thank you for allowing me to participate in the care of this patient. Please do not hesitate to call me with any questions or concerns.    Jorge Sage MD, PhD  Cardiac Electrophysiology

## 2024-12-07 LAB
OHS QRS DURATION: 92 MS
OHS QTC CALCULATION: 412 MS

## 2025-01-28 NOTE — TELEPHONE ENCOUNTER
----- Message from Jorge Sage MD sent at 10/4/2019  9:01 AM CDT -----  Alejandro Morrissey    Can you help arrange ECGs as noted below for Mr. Krueger?    Stop propafenone/diltiazem  Start sotalol 80mg every 12 hours starting Tuesday 10/8/2019  Get an ECG at [facility] on the 3rd floor Wednesday 10/9/2019 at 11AM or 12PM and again on Friday 10/11/2019 at 11AM or 12PM.  
EKGs scheduled.      Attempted to reach Mr. Krueger x 3 with no answer.  Message left asking him to return call to our office.    
Spoke to Kristan Evans.  Confirmed EKGs set up.  He verbalizes understanding and will call our office with any questions or concerns.   
occasional use

## 2025-07-21 NOTE — LETTER
July 19, 2018      Gerard Yang MD  [address]  [address]  [place] [address]           [facility] Ar[facility]hythmia  [address]  [place] [address]  Phone: [phone]  Fax: [phone]          Patient: Fermin Krueger   MR Number: [id number]   YOB: 1980   Date of Visit: 7/19/2018       Dear Dr. Gerard Yang:    Thank you for referring Fermin Krueger to me for evaluation. Attached you will find relevant portions of my assessment and plan of care.    If you have questions, please do not hesitate to call me. I look forward to following Fermin Krueger along with you.    Sincerely,    Jorge Sage MD    Enclosure  CC:  No Recipients    If you would like to receive this communication electronically, please contact [email or web address] or [phone] to request more information on [employer] Link access.    For providers and/or their staff who would like to refer a patient to [facility], please contact us through our one-stop-shop provider referral line, [facility], at [phone].    If you feel you have received this communication in error or would no longer like to receive these types of communications, please e-mail [email or web address]          [FreeTextEntry4] : 3:05pm [FreeTextEntry5] : 4pm [FreeTextEntry1] : chronics stress and anxiety, depression, OCD symptoms, social isolation, physical pain and mobility issues